# Patient Record
Sex: MALE | Race: WHITE | NOT HISPANIC OR LATINO | Employment: PART TIME | URBAN - METROPOLITAN AREA
[De-identification: names, ages, dates, MRNs, and addresses within clinical notes are randomized per-mention and may not be internally consistent; named-entity substitution may affect disease eponyms.]

---

## 2023-12-06 ENCOUNTER — TELEPHONE (OUTPATIENT)
Dept: CARDIOTHORACIC SURGERY | Facility: CLINIC | Age: 68
End: 2023-12-06
Payer: MEDICARE

## 2023-12-06 DIAGNOSIS — Z01.818 PRE-OP EVALUATION: Primary | ICD-10-CM

## 2023-12-06 NOTE — TELEPHONE ENCOUNTER
Called and left message for patient to call back to schedule appt and pft prior. Patient referred for mediastinal mass.

## 2023-12-06 NOTE — PROGRESS NOTES
History & Physical    SUBJECTIVE:     History of Present Illness:  Patient is a 68 y.o. male with HTN, HLD, and posterior mediastinal mass. Chronic cough with multiple report URIs. 3 courses of ABX in the last 6 months, most recently 1 week ago. Recent URI/LRI prompting chest CT in Nov. Chest CT 11/17/23 showed circumscribed smooth mass with 5.3 x 4.5 cm within posterior mediastinum without mass effect on esophagus. No dysphagia.He is very active. No issues with hiking [place]. Not on blood thinners. Does not remember previous imaging.     Nightly wine.     Chief Complaint   Patient presents with    Consult       Review of patient's allergies indicates:  No Known Allergies    Current Outpatient Medications   Medication Sig Dispense Refill    albuterol (PROVENTIL/VENTOLIN HFA) 90 mcg/actuation inhaler Inhale 2 puffs into the lungs every 4 (four) hours as needed.      atorvastatin (LIPITOR) 20 MG tablet = 1 tab, Oral, Daily, # 90 tab, 0 Refill(s), Maintenance, Pharmacy: [id number], 183, cm, 09/12/23 12:39:00 CDT, Height/Length Measured, 86.2, kg, 09/12/23 12:47:00 CDT, Weight Dosing      lisinopriL-hydrochlorothiazide (PRINZIDE,ZESTORETIC) 20-12.5 mg per tablet Take 1 tablet by mouth.      SYMBICORT 160-4.5 mcg/actuation HFAA Inhale 2 puffs into the lungs 2 (two) times daily.       No current facility-administered medications for this visit.       No past medical history on file.  No past surgical history on file.  No family history on file.        Review of Systems:  Review of Systems   Constitutional:  Negative for appetite change and fever.   Respiratory:  Positive for cough. Negative for chest tightness and shortness of breath.    Cardiovascular:  Negative for leg swelling.   Gastrointestinal:  Negative for abdominal pain.   Genitourinary:  Negative for difficulty urinating.   Musculoskeletal:  Negative for arthralgias.   Skin:  Negative for color change and rash.   Neurological:  Negative for  dizziness.   Psychiatric/Behavioral:  Negative for agitation. The patient is not nervous/anxious.        OBJECTIVE:     Vital Signs (Most Recent)    Vitals:    12/07/23 0905   BP: (!) 171/95   Pulse: 62   SpO2: 95%   Weight: 85.5 kg (188 lb 7.9 oz)   Height: 6' (1.829 m)   PainSc: 0-No pain       Physical Exam:  Physical Exam  Constitutional:       Appearance: Normal appearance.   HENT:      Head: Atraumatic.   Eyes:      Extraocular Movements: Extraocular movements intact.   Cardiovascular:      Rate and Rhythm: Normal rate and regular rhythm.      Pulses: Normal pulses.      Heart sounds: Normal heart sounds.   Pulmonary:      Effort: Pulmonary effort is normal.      Breath sounds: Normal breath sounds.   Abdominal:      Palpations: Abdomen is soft.   Musculoskeletal:      Cervical back: Normal range of motion.      Right lower leg: No edema.      Left lower leg: No edema.   Skin:     General: Skin is warm and dry.   Neurological:      General: No focal deficit present.      Mental Status: He is alert and oriented to person, place, and time.         Chest CT 11/17/23:   Outside images reviewed         PFTS:   FEV1: 107%  DLCO: 81%    ASSESSMENT/PLAN:     Patient is a 68 y.o. male with HTN, HLD, and posterior mediastinal mass.concerning for bronchogenic cyst vs esophageal duplication cyst. Favor bronchogenic cyst given history of URI. Discussed with Mr Loco that although small there is a small risk that this represents a malignant process but that biopsy is not advised given the more likelihood that this is a cyst. Also discussed with him that the main reason to operate is to avoid the cyst from becoming a complicated cyst. (Rupture, hemoptysis, etc)    PLAN:Plan     Needs 2D echo and pre op labs for clearance.   Chest CT with PO and IV contrast.   Plan for right robotic assisted posterior mediastinal mass resection, possible bronchoscopy possible EGD.   Will need consents morning of surgery.

## 2023-12-07 ENCOUNTER — OFFICE VISIT (OUTPATIENT)
Dept: CARDIOTHORACIC SURGERY | Facility: CLINIC | Age: 68
End: 2023-12-07
Payer: MEDICARE

## 2023-12-07 ENCOUNTER — HOSPITAL ENCOUNTER (OUTPATIENT)
Dept: PULMONOLOGY | Facility: CLINIC | Age: 68
Discharge: HOME | End: 2023-12-07
Payer: MEDICARE

## 2023-12-07 VITALS
DIASTOLIC BLOOD PRESSURE: 95 MMHG | BODY MASS INDEX: 25.53 KG/M2 | HEIGHT: 72 IN | WEIGHT: 188.5 LBS | SYSTOLIC BLOOD PRESSURE: 171 MMHG | HEART RATE: 62 BPM | OXYGEN SATURATION: 95 %

## 2023-12-07 DIAGNOSIS — Z01.810 PRE-OPERATIVE CARDIOVASCULAR EXAMINATION: ICD-10-CM

## 2023-12-07 DIAGNOSIS — J98.59 MEDIASTINAL MASS: ICD-10-CM

## 2023-12-07 DIAGNOSIS — D68.9 COAGULOPATHY: ICD-10-CM

## 2023-12-07 DIAGNOSIS — Z01.818 PRE-OP EVALUATION: Primary | ICD-10-CM

## 2023-12-07 DIAGNOSIS — Z01.818 PRE-OP EVALUATION: ICD-10-CM

## 2023-12-07 LAB
DLCO SINGLE BREATH LLN: 21.49
DLCO SINGLE BREATH PRE REF: 81.6 %
DLCO SINGLE BREATH REF: 28.42
DLCOC SBVA LLN: 2.75
DLCOC SBVA REF: 3.88
DLCOC SINGLE BREATH LLN: 21.49
DLCOC SINGLE BREATH REF: 28.42
DLCOCSBVAULN: 5
DLCOCSINGLEBREATHULN: 35.35
DLCOSINGLEBREATHULN: 35.35
DLCOVA LLN: 2.75
DLCOVA PRE REF: 97.4 %
DLCOVA PRE: 3.78 ML/(MIN*MMHG*L) (ref 2.75–5)
DLCOVA REF: 3.88
DLCOVAULN: 5
ERV LLN: -16448.89
ERV PRE REF: 113 %
ERV REF: 1.11
ERVULN: ABNORMAL
FEF 25 75 LLN: 1.15
FEF 25 75 PRE REF: 125.1 %
FEF 25 75 REF: 2.59
FET100 CHG: 13.9 %
FEV05 LLN: 1.64
FEV05 REF: 2.77
FEV1 CHG: -5.6 %
FEV1 FVC LLN: 63
FEV1 FVC PRE REF: 103.1 %
FEV1 FVC REF: 76
FEV1 LLN: 2.46
FEV1 PRE REF: 107.2 %
FEV1 REF: 3.38
FEV1 VOL CHG: -0.2
FRCPLETH LLN: 2.75
FRCPLETH PREREF: 96.5 %
FRCPLETH REF: 3.74
FRCPLETHULN: 4.73
FVC CHG: -3.6 %
FVC LLN: 3.33
FVC PRE REF: 103.6 %
FVC REF: 4.47
FVC VOL CHG: -0.17
IVC PRE: 4.27 L (ref 3.33–5.62)
IVC SINGLE BREATH LLN: 3.33
IVC SINGLE BREATH PRE REF: 95.6 %
IVC SINGLE BREATH REF: 4.47
IVCSINGLEBREATHULN: 5.62
LLN IC: -9999996.67
PEF LLN: 6.4
PEF PRE REF: 102.5 %
PEF REF: 8.78
PHYSICIAN COMMENT: ABNORMAL
POST FEF 25 75: 2.88 L/S (ref 1.15–4.61)
POST FET 100: 7.18 SEC
POST FEV1 FVC: 76.72 % (ref 62.94–87.63)
POST FEV1: 3.42 L (ref 2.46–4.24)
POST FEV5: 2.59 L (ref 1.64–3.91)
POST FVC: 4.46 L (ref 3.33–5.62)
POST PEF: 8.59 L/S (ref 6.4–11.16)
PRE DLCO: 23.19 ML/(MIN*MMHG) (ref 21.49–35.35)
PRE ERV: 1.26 L (ref -16448.89–16451.11)
PRE FEF 25 75: 3.24 L/S (ref 1.15–4.61)
PRE FET 100: 6.31 SEC
PRE FEV05 REF: 100.5 %
PRE FEV1 FVC: 78.38 % (ref 62.94–87.63)
PRE FEV1: 3.63 L (ref 2.46–4.24)
PRE FEV5: 2.79 L (ref 1.64–3.91)
PRE FRC PL: 3.61 L (ref 2.75–4.73)
PRE FVC: 4.63 L (ref 3.33–5.62)
PRE IC: 3.37 L (ref -9999996.67–#######.####)
PRE PEF: 9 L/S (ref 6.4–11.16)
PRE REF IC: 101.1 %
PRE RV: 2.35 L (ref 1.95–3.3)
PRE TLC: 6.98 L (ref 6.18–8.48)
RAW PRE REF: 47.7 %
RAW PRE: 1.46 CMH2O*S/L (ref 3.06–3.06)
RAW REF: 3.06
REF IC: 3.33
RV LLN: 1.95
RV PRE REF: 89.5 %
RV REF: 2.63
RVTLC LLN: 31
RVTLC PRE REF: 83.3 %
RVTLC PRE: 33.7 % (ref 31.5–49.46)
RVTLC REF: 40
RVTLCULN: 49
RVULN: 3.3
SGAW PRE REF: 188.9 %
SGAW PRE: 0.16 1/(CMH2O*S) (ref 0.08–0.08)
SGAW REF: 0.08
TLC LLN: 6.18
TLC PRE REF: 95.2 %
TLC REF: 7.33
TLC ULN: 8.48
ULN IC: ABNORMAL
VA PRE: 6.14 L (ref 7.18–7.18)
VA SINGLE BREATH LLN: 7.18
VA SINGLE BREATH PRE REF: 85.5 %
VA SINGLE BREATH REF: 7.18
VASINGLEBREATHULN: 7.18
VC LLN: 3.33
VC PRE REF: 103.6 %
VC PRE: 4.63 L (ref 3.33–5.62)
VC REF: 4.47
VC ULN: 5.62

## 2023-12-07 PROCEDURE — 94060 PR EVAL OF BRONCHOSPASM: ICD-10-PCS | Mod: 26,S$PBB,, | Performed by: INTERNAL MEDICINE

## 2023-12-07 PROCEDURE — 94729 DIFFUSING CAPACITY: CPT | Mod: 26,S$PBB,, | Performed by: INTERNAL MEDICINE

## 2023-12-07 PROCEDURE — 94729 DIFFUSING CAPACITY: CPT | Mod: PBBFAC | Performed by: INTERNAL MEDICINE

## 2023-12-07 PROCEDURE — 94060 EVALUATION OF WHEEZING: CPT | Mod: PBBFAC | Performed by: INTERNAL MEDICINE

## 2023-12-07 PROCEDURE — 99205 OFFICE O/P NEW HI 60 MIN: CPT | Mod: 57,S$PBB,, | Performed by: STUDENT IN AN ORGANIZED HEALTH CARE EDUCATION/TRAINING PROGRAM

## 2023-12-07 PROCEDURE — 99999 PR PBB SHADOW E&M-EST. PATIENT-LVL III: ICD-10-PCS | Mod: PBBFAC,,, | Performed by: STUDENT IN AN ORGANIZED HEALTH CARE EDUCATION/TRAINING PROGRAM

## 2023-12-07 PROCEDURE — 99213 OFFICE O/P EST LOW 20 MIN: CPT | Mod: PBBFAC,25 | Performed by: STUDENT IN AN ORGANIZED HEALTH CARE EDUCATION/TRAINING PROGRAM

## 2023-12-07 PROCEDURE — 94060 EVALUATION OF WHEEZING: CPT | Mod: 26,S$PBB,, | Performed by: INTERNAL MEDICINE

## 2023-12-07 PROCEDURE — 94729 PR C02/MEMBANE DIFFUSE CAPACITY: ICD-10-PCS | Mod: 26,S$PBB,, | Performed by: INTERNAL MEDICINE

## 2023-12-07 PROCEDURE — 94726 PLETHYSMOGRAPHY LUNG VOLUMES: CPT | Mod: 26,S$PBB,, | Performed by: INTERNAL MEDICINE

## 2023-12-07 PROCEDURE — 99999 PR PBB SHADOW E&M-EST. PATIENT-LVL III: CPT | Mod: PBBFAC,,, | Performed by: STUDENT IN AN ORGANIZED HEALTH CARE EDUCATION/TRAINING PROGRAM

## 2023-12-07 PROCEDURE — 94726 PULM FUNCT TST PLETHYSMOGRAP: ICD-10-PCS | Mod: 26,S$PBB,, | Performed by: INTERNAL MEDICINE

## 2023-12-07 PROCEDURE — 94726 PLETHYSMOGRAPHY LUNG VOLUMES: CPT | Mod: PBBFAC | Performed by: INTERNAL MEDICINE

## 2023-12-07 PROCEDURE — 99205 PR OFFICE/OUTPT VISIT, NEW, LEVL V, 60-74 MIN: ICD-10-PCS | Mod: 57,S$PBB,, | Performed by: STUDENT IN AN ORGANIZED HEALTH CARE EDUCATION/TRAINING PROGRAM

## 2023-12-07 RX ORDER — LISINOPRIL AND HYDROCHLOROTHIAZIDE 12.5; 2 MG/1; MG/1
1 TABLET ORAL
COMMUNITY
End: 2024-01-05

## 2023-12-07 RX ORDER — ATORVASTATIN CALCIUM 20 MG/1
TABLET, FILM COATED ORAL
COMMUNITY
Start: 2023-11-27

## 2023-12-07 RX ORDER — ALBUTEROL SULFATE 90 UG/1
2 AEROSOL, METERED RESPIRATORY (INHALATION) EVERY 4 HOURS PRN
Status: ON HOLD | COMMUNITY
End: 2024-01-09 | Stop reason: CLARIF

## 2023-12-07 RX ORDER — BUDESONIDE AND FORMOTEROL FUMARATE DIHYDRATE 160; 4.5 UG/1; UG/1
2 AEROSOL RESPIRATORY (INHALATION) 2 TIMES DAILY
COMMUNITY
Start: 2023-11-29

## 2023-12-08 ENCOUNTER — HOSPITAL ENCOUNTER (OUTPATIENT)
Dept: CARDIOLOGY | Facility: HOSPITAL | Age: 68
Discharge: HOME | End: 2023-12-08
Attending: PHYSICIAN ASSISTANT
Payer: MEDICARE

## 2023-12-08 VITALS — BODY MASS INDEX: 25.47 KG/M2 | WEIGHT: 188 LBS | HEIGHT: 72 IN

## 2023-12-08 DIAGNOSIS — Z01.810 PRE-OPERATIVE CARDIOVASCULAR EXAMINATION: ICD-10-CM

## 2023-12-08 DIAGNOSIS — J98.59 MEDIASTINAL MASS: Primary | ICD-10-CM

## 2023-12-08 PROCEDURE — 93306 ECHO (CUPID ONLY): ICD-10-PCS | Mod: 26,,, | Performed by: INTERNAL MEDICINE

## 2023-12-08 PROCEDURE — 93306 TTE W/DOPPLER COMPLETE: CPT | Mod: 26,,, | Performed by: INTERNAL MEDICINE

## 2023-12-08 PROCEDURE — 93306 TTE W/DOPPLER COMPLETE: CPT

## 2023-12-09 LAB
AORTIC ROOT ANNULUS: 3 CM
AORTIC VALVE CUSP SEPERATION: 2 CM
AV INDEX (PROSTH): 0.81
AV MEAN GRADIENT: 5 MMHG
AV PEAK GRADIENT: 10 MMHG
AV VALVE AREA BY VELOCITY RATIO: 2.6 CM²
AV VALVE AREA: 2.82 CM²
AV VELOCITY RATIO: 0.75
BSA FOR ECHO PROCEDURE: 2.08 M2
CV ECHO LV RWT: 0.49 CM
DOP CALC AO PEAK VEL: 1.56 M/S
DOP CALC AO VTI: 30 CM
DOP CALC LVOT AREA: 3.5 CM2
DOP CALC LVOT DIAMETER: 2.1 CM
DOP CALC LVOT PEAK VEL: 1.17 M/S
DOP CALC LVOT STROKE VOLUME: 84.47 CM3
DOP CALCLVOT PEAK VEL VTI: 24.4 CM
E WAVE DECELERATION TIME: 232 MSEC
E/A RATIO: 0.9
E/E' RATIO: 8.1 M/S
ECHO LV POSTERIOR WALL: 1.22 CM (ref 0.6–1.1)
FRACTIONAL SHORTENING: 37 % (ref 28–44)
INTERVENTRICULAR SEPTUM: 1.04 CM (ref 0.6–1.1)
IVRT: 106 MSEC
LEFT ATRIUM SIZE: 3.7 CM
LEFT INTERNAL DIMENSION IN SYSTOLE: 3.11 CM (ref 2.1–4)
LEFT VENTRICLE DIASTOLIC VOLUME INDEX: 55.77 ML/M2
LEFT VENTRICLE DIASTOLIC VOLUME: 116 ML
LEFT VENTRICLE MASS INDEX: 102 G/M2
LEFT VENTRICLE SYSTOLIC VOLUME INDEX: 18.4 ML/M2
LEFT VENTRICLE SYSTOLIC VOLUME: 38.2 ML
LEFT VENTRICULAR INTERNAL DIMENSION IN DIASTOLE: 4.96 CM (ref 3.5–6)
LEFT VENTRICULAR MASS: 212.21 G
LV LATERAL E/E' RATIO: 6.75 M/S
LV SEPTAL E/E' RATIO: 10.13 M/S
LVOT MG: 3 MMHG
LVOT MV: 0.76 CM/S
MV PEAK A VEL: 0.9 M/S
MV PEAK E VEL: 0.81 M/S
MV STENOSIS PRESSURE HALF TIME: 65 MS
MV VALVE AREA P 1/2 METHOD: 3.38 CM2
PISA TR MAX VEL: 2.51 M/S
RA PRESSURE ESTIMATED: 3 MMHG
RIGHT VENTRICULAR END-DIASTOLIC DIMENSION: 2.22 CM
RV TB RVSP: 6 MMHG
TDI LATERAL: 0.12 M/S
TDI SEPTAL: 0.08 M/S
TDI: 0.1 M/S
TR MAX PG: 25 MMHG
TV REST PULMONARY ARTERY PRESSURE: 28 MMHG
Z-SCORE OF LEFT VENTRICULAR DIMENSION IN END DIASTOLE: -2.5
Z-SCORE OF LEFT VENTRICULAR DIMENSION IN END SYSTOLE: -1.78

## 2023-12-12 ENCOUNTER — PATIENT MESSAGE (OUTPATIENT)
Dept: CARDIOTHORACIC SURGERY | Facility: CLINIC | Age: 68
End: 2023-12-12
Payer: MEDICARE

## 2023-12-18 ENCOUNTER — HOSPITAL ENCOUNTER (OUTPATIENT)
Dept: RADIOLOGY | Facility: HOSPITAL | Age: 68
Discharge: HOME | End: 2023-12-18
Attending: PHYSICIAN ASSISTANT
Payer: MEDICARE

## 2023-12-18 DIAGNOSIS — Z01.810 PRE-OPERATIVE CARDIOVASCULAR EXAMINATION: Primary | ICD-10-CM

## 2023-12-18 DIAGNOSIS — J98.59 MEDIASTINAL MASS: Primary | ICD-10-CM

## 2023-12-18 DIAGNOSIS — J98.59 MEDIASTINAL MASS: ICD-10-CM

## 2023-12-18 PROCEDURE — 71260 CT THORAX DX C+: CPT | Mod: TC,PO

## 2023-12-18 PROCEDURE — 74160 CT ABDOMEN W/CONTRAST: CPT | Mod: TC,PO

## 2023-12-18 PROCEDURE — 25500020 PHARM REV CODE 255: Mod: PO | Performed by: PHYSICIAN ASSISTANT

## 2023-12-18 RX ADMIN — IOHEXOL 100 ML: 350 INJECTION, SOLUTION INTRAVENOUS at 01:12

## 2023-12-20 DIAGNOSIS — J98.59 MEDIASTINAL MASS: Primary | ICD-10-CM

## 2023-12-22 ENCOUNTER — TELEPHONE (OUTPATIENT)
Dept: ENDOSCOPY | Facility: HOSPITAL | Age: 68
End: 2023-12-22
Payer: MEDICARE

## 2023-12-22 ENCOUNTER — PATIENT MESSAGE (OUTPATIENT)
Dept: CARDIOTHORACIC SURGERY | Facility: CLINIC | Age: 68
End: 2023-12-22
Payer: MEDICARE

## 2023-12-22 NOTE — TELEPHONE ENCOUNTER
Received phone call from Kimberlee from Dr. Orozco's office to schedule patient for an EUS. Informed Kimberlee that message will have to be sent to the advanced endoscopy scheduling team. Verbalized understanding.

## 2023-12-22 NOTE — TELEPHONE ENCOUNTER
Please reach out to patient to schedule. Patient has referral to endo procedure  placed on 12/20/23.    Thank You,    Stefanie

## 2023-12-26 ENCOUNTER — TELEPHONE (OUTPATIENT)
Dept: ENDOSCOPY | Facility: HOSPITAL | Age: 68
End: 2023-12-26
Payer: MEDICARE

## 2023-12-26 NOTE — TELEPHONE ENCOUNTER
Hello,     Patient called and left a voicemail to schedule for an endoscopy procedure. Refendo is from Dr. Orozco for an EUS. Please reach out to patient to schedule.     Thank you,   Jodie

## 2023-12-26 NOTE — TELEPHONE ENCOUNTER
Called patient. Patient did not answer. Left message for patient that his procedure to be scheduled is for an EUS. Will send message to advanced schedulers to reach out to him.

## 2023-12-26 NOTE — TELEPHONE ENCOUNTER
Received patient's voicemail    1:15 PM MRN: [id number] This is Clarence Alberto [birth year]. Phone number is [phone]. I was expecting a call today to schedule an endoscopy. I have a surgery with Dr. Orozco thoracic surgery. He put in the order for the Endoscopy.

## 2023-12-27 ENCOUNTER — TELEPHONE (OUTPATIENT)
Dept: ENDOSCOPY | Facility: HOSPITAL | Age: 68
End: 2023-12-27
Payer: MEDICARE

## 2023-12-27 ENCOUNTER — PATIENT MESSAGE (OUTPATIENT)
Dept: ENDOSCOPY | Facility: HOSPITAL | Age: 68
End: 2023-12-27
Payer: MEDICARE

## 2023-12-27 DIAGNOSIS — J98.59 MEDIASTINAL MASS: Primary | ICD-10-CM

## 2023-12-27 NOTE — TELEPHONE ENCOUNTER
Spoke to Pt to schedule procedure(s) Upper Endoscopy Ultrasound (EUS)       Physician to perform procedure(s) Dr. VEE Turpin  Date of Procedure (s) 12/29/23  Arrival Time 8:00 AM  Time of Procedure(s) 9:00 AM   Location of Procedure(s) [address]  Type of Rx Prep sent to patient: N/A  Instructions provided to patient via [facility]    Patient was informed on the following information and verbalized understanding. Screening questionnaire reviewed with patient and complete. If procedure requires anesthesia, a responsible adult needs to be present to accompany the patient home, patient cannot drive after receiving anesthesia. Appointment details are tentative, especially check-in time. Patient will receive a prep-op call 7 days prior to confirm check-in time for procedure. If applicable the patient should contact their pharmacy to verify Rx for procedure prep is ready for pick-up. Patient was advised to call the scheduling department at [phone] if pharmacy states no Rx is available. Patient was advised to call the endoscopy scheduling department if any questions or concerns arise.      [facility] Scheduling Department

## 2023-12-27 NOTE — TELEPHONE ENCOUNTER
Ciera estrella MD Thomas, [place], MA; [facility] Schedulers; Forest Orozco MD  EGD and EUS with any provider, preferably main campus within next 1-2 weeks to eval posterior mediastinal mass/lesion thought to be cystic on imaging; needle sampling only requested if it appears to be a mass arising from the wall of the esophagus; no needle sampling if cystic.

## 2023-12-29 ENCOUNTER — HOSPITAL ENCOUNTER (OUTPATIENT)
Facility: HOSPITAL | Age: 68
Discharge: HOME | End: 2023-12-29
Attending: INTERNAL MEDICINE | Admitting: INTERNAL MEDICINE
Payer: MEDICARE

## 2023-12-29 ENCOUNTER — ANESTHESIA (OUTPATIENT)
Dept: ENDOSCOPY | Facility: HOSPITAL | Age: 68
End: 2023-12-29
Payer: MEDICARE

## 2023-12-29 ENCOUNTER — ANESTHESIA EVENT (OUTPATIENT)
Dept: ENDOSCOPY | Facility: HOSPITAL | Age: 68
End: 2023-12-29
Payer: MEDICARE

## 2023-12-29 VITALS
TEMPERATURE: 98 F | HEIGHT: 72 IN | WEIGHT: 188 LBS | RESPIRATION RATE: 17 BRPM | DIASTOLIC BLOOD PRESSURE: 99 MMHG | BODY MASS INDEX: 25.47 KG/M2 | HEART RATE: 52 BPM | OXYGEN SATURATION: 100 % | SYSTOLIC BLOOD PRESSURE: 169 MMHG

## 2023-12-29 DIAGNOSIS — J98.59 MEDIASTINAL MASS: ICD-10-CM

## 2023-12-29 PROCEDURE — 37000008 HC ANESTHESIA 1ST 15 MINUTES: Performed by: INTERNAL MEDICINE

## 2023-12-29 PROCEDURE — 43237 ENDOSCOPIC US EXAM ESOPH: CPT | Performed by: INTERNAL MEDICINE

## 2023-12-29 PROCEDURE — D9220A PRA ANESTHESIA: Mod: ANES,,, | Performed by: ANESTHESIOLOGY

## 2023-12-29 PROCEDURE — D9220A PRA ANESTHESIA: ICD-10-PCS | Mod: ANES,,, | Performed by: ANESTHESIOLOGY

## 2023-12-29 PROCEDURE — 37000009 HC ANESTHESIA EA ADD 15 MINS: Performed by: INTERNAL MEDICINE

## 2023-12-29 PROCEDURE — D9220A PRA ANESTHESIA: Mod: CRNA,,, | Performed by: NURSE ANESTHETIST, CERTIFIED REGISTERED

## 2023-12-29 PROCEDURE — 25000003 PHARM REV CODE 250: Performed by: NURSE ANESTHETIST, CERTIFIED REGISTERED

## 2023-12-29 PROCEDURE — 43237 ENDOSCOPIC US EXAM ESOPH: CPT | Mod: ,,, | Performed by: INTERNAL MEDICINE

## 2023-12-29 PROCEDURE — 63600175 PHARM REV CODE 636 W HCPCS: Performed by: NURSE ANESTHETIST, CERTIFIED REGISTERED

## 2023-12-29 PROCEDURE — 25000003 PHARM REV CODE 250: Performed by: INTERNAL MEDICINE

## 2023-12-29 PROCEDURE — D9220A PRA ANESTHESIA: ICD-10-PCS | Mod: CRNA,,, | Performed by: NURSE ANESTHETIST, CERTIFIED REGISTERED

## 2023-12-29 RX ORDER — SODIUM CHLORIDE 9 MG/ML
INJECTION, SOLUTION INTRAVENOUS CONTINUOUS
Status: DISCONTINUED | OUTPATIENT
Start: 2023-12-29 | End: 2023-12-29 | Stop reason: HOSPADM

## 2023-12-29 RX ORDER — LIDOCAINE HYDROCHLORIDE 10 MG/ML
INJECTION, SOLUTION INTRAVENOUS
Status: DISCONTINUED | OUTPATIENT
Start: 2023-12-29 | End: 2023-12-29

## 2023-12-29 RX ORDER — PROPOFOL 10 MG/ML
VIAL (ML) INTRAVENOUS CONTINUOUS PRN
Status: DISCONTINUED | OUTPATIENT
Start: 2023-12-29 | End: 2023-12-29

## 2023-12-29 RX ADMIN — SODIUM CHLORIDE: 0.9 INJECTION, SOLUTION INTRAVENOUS at 09:12

## 2023-12-29 RX ADMIN — PROPOFOL 200 MCG/KG/MIN: 10 INJECTION, EMULSION INTRAVENOUS at 09:12

## 2023-12-29 RX ADMIN — SODIUM CHLORIDE: 9 INJECTION, SOLUTION INTRAVENOUS at 07:12

## 2023-12-29 RX ADMIN — LIDOCAINE HYDROCHLORIDE 50 MG: 10 INJECTION, SOLUTION INTRAVENOUS at 09:12

## 2023-12-29 NOTE — H&P
Short Stay Endoscopy History and Physical    PCP - No, Primary Doctor  Referring Physician - Forest Orozco MD  [id number] [place] [address]    Procedure - eus  ASA - per anesthesia  Mallampati - per anesthesia  History of Anesthesia problems - no  Family history Anesthesia problems -  no   Plan of anesthesia - General    HPI:  This is a 68 y.o. male here for evaluation of: mediastinal lesion    Reflux - no  Dysphagia - no  Abdominal pain - no  Diarrhea - no    ROS:  Constitutional: No fevers, chills, No weight loss  CV: No chest pain  Pulm: No cough, No shortness of breath  Ophtho: No vision changes  GI: see HPI  Derm: No rash    Medical History:  has no past medical history on file.    Surgical History:  has no past surgical history on file.    Family History: family history is not on file..    Social History:  reports that he has never smoked. He does not have any smokeless tobacco history on file. He reports current alcohol use.    Review of patient's allergies indicates:  No Known Allergies    Medications:   Medications Prior to Admission   Medication Sig Dispense Refill Last Dose    atorvastatin (LIPITOR) 20 MG tablet = 1 tab, Oral, Daily, # 90 tab, 0 Refill(s), Maintenance, Pharmacy: [id number], 183, cm, 09/12/23 12:39:00 CDT, Height/Length Measured, 86.2, kg, 09/12/23 12:47:00 CDT, Weight Dosing   12/29/2023    lisinopriL-hydrochlorothiazide (PRINZIDE,ZESTORETIC) 20-12.5 mg per tablet Take 1 tablet by mouth.   12/29/2023    SYMBICORT 160-4.5 mcg/actuation HFAA Inhale 2 puffs into the lungs 2 (two) times daily.   12/29/2023    albuterol (PROVENTIL/VENTOLIN HFA) 90 mcg/actuation inhaler Inhale 2 puffs into the lungs every 4 (four) hours as needed.          Physical Exam:    Vital Signs:   Vitals:    12/29/23 0740   BP: (!) 197/99   Pulse: (!) 59   Resp: 16   Temp: 97.7 °F (36.5 °C)       General Appearance: Well appearing in no acute distress    Labs:  Lab Results   Component Value  Date    WBC 8.98 12/07/2023    HGB 15.3 12/07/2023    HCT 44.0 12/07/2023     12/07/2023    CHOL 233 (H) 08/12/2020    TRIG 94 08/12/2020    HDL 64 (H) 08/12/2020    ALT 31 12/07/2023    AST 29 12/07/2023     12/07/2023    K 3.7 12/07/2023     12/07/2023    CREATININE 1.2 12/07/2023    BUN 21 12/07/2023    CO2 27 12/07/2023    TSH 1.88 08/12/2020    INR 0.9 12/07/2023       I have explained the risks and benefits of this endoscopic procedure to the patient including but not limited to bleeding, inflammation, infection, perforation, and death.      Pierre Turpin MD

## 2023-12-29 NOTE — [employer] PATIENT INSTRUCTIONS
Discharge Summary/Instructions after an Endoscopic Procedure  Patient Name: Clarence Alberto  Patient MRN: [id number]  Patient YOB: 1955 Friday, December 29, 2023  Pierre Turpin MD  Dear patient,  As a result of recent federal legislation (The Federal Cures Act), you may   receive lab or pathology results from your procedure in your [facility]   account before your physician is able to contact you. Your physician or   their representative will relay the results to you with their   recommendations at their soonest availability.  Thank you,  RESTRICTIONS:  During your procedure today, you received medications for sedation.  These   medications may affect your judgment, balance and coordination.  Therefore,   for 24 hours, you have the following restrictions:   - DO NOT drive a car, operate machinery, make legal/financial decisions,   sign important papers or drink alcohol.    ACTIVITY:  Today: no heavy lifting, straining or running due to procedural   sedation/anesthesia.  The following day: return to full activity including work.  DIET:  Eat and drink normally unless instructed otherwise.     TREATMENT FOR COMMON SIDE EFFECTS:  - Mild abdominal pain, nausea, belching, bloating or excessive gas:  rest,   eat lightly and use a heating pad.  - Sore Throat: treat with throat lozenges and/or gargle with warm salt   water.  - Because air was used during the procedure, expelling large amounts of air   from your rectum or belching is normal.  - If a bowel prep was taken, you may not have a bowel movement for 1-3 days.    This is normal.  SYMPTOMS TO WATCH FOR AND REPORT TO YOUR PHYSICIAN:  1. Abdominal pain or bloating, other than gas cramps.  2. Chest pain.  3. Back pain.  4. Signs of infection such as: chills or fever occurring within 24 hours   after the procedure.  5. Rectal bleeding, which would show as bright red, maroon, or black stools.   (A tablespoon of blood from the rectum is not serious, especially  if   hemorrhoids are present.)  6. Vomiting.  7. Weakness or dizziness.  GO DIRECTLY TO THE NEAREST EMERGENCY ROOM IF YOU HAVE ANY OF THE FOLLOWING:      Difficulty breathing              Chills and/or fever over 101 F   Persistent vomiting and/or vomiting blood   Severe abdominal pain   Severe chest pain   Black, tarry stools   Bleeding- more than one tablespoon   Any other symptom or condition that you feel may need urgent attention  Your doctor recommends these additional instructions:  If any biopsies were taken, your doctors clinic will contact you in 1 to 2   weeks with any results.  - Discharge patient to home.   - Resume previous diet.   - Continue present medications.   - Return to referring physician as previously scheduled.  For questions, problems or results please call your physician - Pierre Turpin MD at Work:  [phone].  [facility], EMERGENCY ROOM PHONE NUMBER: [phone]  IF A COMPLICATION OR EMERGENCY SITUATION ARISES AND YOU ARE UNABLE TO REACH   YOUR PHYSICIAN - GO DIRECTLY TO THE EMERGENCY ROOM.  Pierre Turpin MD  12/29/2023 9:26:19 AM  This report has been verified and signed electronically.  Dear patient,  As a result of recent federal legislation (The Federal Cures Act), you may   receive lab or pathology results from your procedure in your [facility]   account before your physician is able to contact you. Your physician or   their representative will relay the results to you with their   recommendations at their soonest availability.  Thank you,  [employer]ATION

## 2023-12-29 NOTE — TRANSFER OF CARE
Anesthesia Transfer of Care Note    Patient: Clarence Alberto    Procedure(s) Performed: Procedure(s) (LRB):  EGD (ESOPHAGOGASTRODUODENOSCOPY) (N/A)  ULTRASOUND, UPPER GI TRACT, ENDOSCOPIC (N/A)    Patient location: [facility]    Anesthesia Type: general    Transport from OR: Transported from OR on room air with adequate spontaneous ventilation    Post pain: adequate analgesia    Post assessment: no apparent anesthetic complications and tolerated procedure well    Post vital signs: stable    Level of consciousness: lethargic    Nausea/Vomiting: no nausea/vomiting    Complications: none    Transfer of care protocol was followed      Last vitals: Visit Vitals  BP (!) 197/99 (Patient Position: Lying)   Pulse (!) 59   Temp 36.5 °C (97.7 °F) (Temporal)   Resp 16   Ht 6' (1.829 m)   Wt 85.3 kg (188 lb)   SpO2 99%   BMI 25.50 kg/m²

## 2023-12-29 NOTE — ANESTHESIA PREPROCEDURE EVALUATION
2023  Clarence Alberto is a 68 y.o., male.  Pre-operative evaluation for EGD (ESOPHAGOGASTRODUODENOSCOPY) (N/A), ULTRASOUND, UPPER GI TRACT, ENDOSCOPIC (N/A)    Chief Complaint: psoterior mediastinal mass    PMH:  HTN, HLP.   Chronic cough with multiple report URIs. 3 courses of ABX in the last 6 months, most recently 1 week ago. Recent URI/LRI prompting chest CT in Nov. Chest CT 23 showed circumscribed smooth mass with 5.3 x 4.5 cm within posterior mediastinum without mass effect on esophagus. Follow up CT Chest/Abdomen with contrast 23 redemonstrated known heterogeneous oval soft tissue mass in the posterior mediastinum appearing to arise from the right lateral wall of the esophagus.      History reviewed. No pertinent surgical history.      Vital Signs Range (Last 24H):  Temp:  [36.5 °C (97.7 °F)]   Pulse:  [59]   Resp:  [16]   BP: (197)/(99)   SpO2:  [99 %]       CBC:     Recent Labs   Lab 23  1127   WBC 8.98   RBC 4.77   HGB 15.3   HCT 44.0      MCV 92   MCH 32.1*   MCHC 34.8       CMP:   Recent Labs   Lab 23  1127      K 3.7      CO2 27   BUN 21   CREATININE 1.2      CALCIUM 10.2   ALBUMIN 4.2   PROT 8.4   ALKPHOS 67   ALT 31   AST 29   BILITOT 0.5       INR:  Recent Labs   Lab 23  1127   INR 0.9   APTT 27.6         Diagnostic Studies:      EKD Echo: 23       Left Ventricle: The left ventricle is normal in size. Mildly increased wall thickness. There is mild concentric hypertrophy. Normal wall motion. There is normal systolic function with a visually estimated ejection fraction of 65 - 70%. There is normal diastolic function.    Right Ventricle: Normal right ventricular cavity size. Wall thickness is normal. Right ventricle wall motion  is normal. Systolic function is normal.    Left Atrium: Left atrium is mildly dilated.     Mitral Valve: There is mild regurgitation with a centrally directed jet.    Tricuspid Valve: There is mild regurgitation with a centrally directed jet.    IVC/SVC: Normal venous pressure at 3 mmHg.    Pre-op Assessment    I have reviewed the Patient Summary Reports.     I have reviewed the Nursing Notes. I have reviewed the NPO Status.   I have reviewed the Medications.     Review of Systems  Anesthesia Hx:  No problems with previous Anesthesia                Social:  Non-Smoker, No Alcohol Use       Cardiovascular:  Cardiovascular Normal                                            Pulmonary:  Pulmonary Normal                       Neurological:  Neurology Normal                                          Physical Exam  General: Well nourished, Cooperative, Alert and Oriented    Airway:  Mallampati: I   Mouth Opening: Normal  TM Distance: Normal  Tongue: Normal  Neck ROM: Normal ROM    Dental:  Intact    Chest/Lungs:  Normal Respiratory Rate        Anesthesia Plan  Type of Anesthesia, risks & benefits discussed:    Anesthesia Type: Gen Natural Airway, Gen ETT  Intra-op Monitoring Plan: Standard ASA Monitors  Post Op Pain Control Plan: multimodal analgesia  Induction:  IV  Airway Plan: Direct  Informed Consent: Informed consent signed with the Patient and all parties understand the risks and agree with anesthesia plan.  All questions answered.   ASA Score: 3  Day of Surgery Review of History & Physical: H&P Update referred to the surgeon/provider.    Ready For Surgery From Anesthesia Perspective.     .

## 2023-12-29 NOTE — ANESTHESIA POSTPROCEDURE EVALUATION
Anesthesia Post Evaluation    Patient: Claernce Alberto    Procedure(s) Performed: Procedure(s) (LRB):  EGD (ESOPHAGOGASTRODUODENOSCOPY) (N/A)  ULTRASOUND, UPPER GI TRACT, ENDOSCOPIC (N/A)    Final Anesthesia Type: general      Patient location during evaluation: PACU  Patient participation: Yes- Able to Participate  Level of consciousness: awake and alert and oriented  Post-procedure vital signs: reviewed and stable  Pain management: adequate  Airway patency: patent    PONV status at discharge: No PONV  Anesthetic complications: no      Cardiovascular status: hemodynamically stable  Respiratory status: unassisted  Hydration status: euvolemic  Follow-up not needed.              Vitals Value Taken Time   /99 12/29/23 1000   Temp 36.6 °C (97.9 °F) 12/29/23 0930   Pulse 52 12/29/23 1000   Resp 17 12/29/23 1000   SpO2 100 % 12/29/23 1000         No case tracking events are documented in the log.      Pain/Joey Score: Joey Score: 10 (12/29/2023  9:45 AM)

## 2024-01-03 ENCOUNTER — TELEPHONE (OUTPATIENT)
Dept: ENDOSCOPY | Facility: HOSPITAL | Age: 69
End: 2024-01-03

## 2024-01-03 ENCOUNTER — CLINICAL SUPPORT (OUTPATIENT)
Dept: ENDOSCOPY | Facility: HOSPITAL | Age: 69
End: 2024-01-03
Payer: MEDICARE

## 2024-01-03 DIAGNOSIS — J98.59 MEDIASTINAL MASS: ICD-10-CM

## 2024-01-05 RX ORDER — MULTIVIT WITH MIN/ALA/HERBS 50 MG
1 TABLET ORAL DAILY
COMMUNITY

## 2024-01-05 RX ORDER — LISINOPRIL 40 MG/1
40 TABLET ORAL DAILY
COMMUNITY

## 2024-01-08 ENCOUNTER — TELEPHONE (OUTPATIENT)
Dept: CARDIOTHORACIC SURGERY | Facility: CLINIC | Age: 69
End: 2024-01-08
Payer: MEDICARE

## 2024-01-08 ENCOUNTER — ANESTHESIA EVENT (OUTPATIENT)
Dept: SURGERY | Facility: HOSPITAL | Age: 69
DRG: 376 | End: 2024-01-08
Payer: MEDICARE

## 2024-01-08 NOTE — ANESTHESIA PREPROCEDURE EVALUATION
[facility]  Anesthesia Pre-Operative Evaluation         Patient Name: Clarence Alberto  YOB: 1955  MRN: [id number]    SUBJECTIVE:     Pre-operative evaluation for Procedure(s) (LRB):  XI ROBOTIC MEDIASTINAL MASS RESECTION POSSIBLE BRONCH POSS EGD (Right)     01/08/2024    Clarence Alberto is a 68 y.o. male w/ a significant PMHx of HTN, HLD, and Chronic cough with multiple report URIs. Recent URI/LRI prompting chest CT in Nov. Chest CT 11/17/23 showed circumscribed smooth mass with 5.3 x 4.5 cm within posterior mediastinum without mass effect on esophagus. Follow up CT Chest/Abdomen with contrast 12/18/23 redemonstrated known heterogeneous oval soft tissue mass in the posterior mediastinum appearing to arise from the right lateral wall of the esophagus.        Pt now presents for the above procedure.      LDA: None documented.    Prev airway: None documented.     Drips: None documented.    Patient Active Problem List   Diagnosis    Pre-op evaluation       Review of patient's allergies indicates:  No Known Allergies    Current Inpatient Medications:      No current facility-administered medications on file prior to encounter.     Current Outpatient Medications on File Prior to Encounter   Medication Sig Dispense Refill    atorvastatin (LIPITOR) 20 MG tablet = 1 tab, Oral, Daily, # 90 tab, 0 Refill(s), Maintenance, Pharmacy: [id number], 183, cm, 09/12/23 12:39:00 CDT, Height/Length Measured, 86.2, kg, 09/12/23 12:47:00 CDT, Weight Dosing      lisinopriL (PRINIVIL,ZESTRIL) 40 MG tablet Take 40 mg by mouth once daily.      om-3-dha-epa-fish-D3-saw-herbs (PROSTATE OPTIMIZER) 6.6 mg-100 mg- 200 mg-5 mcg Cap Take 1 capsule by mouth Daily.      albuterol (PROVENTIL/VENTOLIN HFA) 90 mcg/actuation inhaler Inhale 2 puffs into the lungs every 4 (four) hours as needed.      SYMBICORT 160-4.5 mcg/actuation HFAA Inhale 2 puffs into the lungs 2 (two) times daily.         Past Surgical  "History:   Procedure Laterality Date    ENDOSCOPIC ULTRASOUND OF UPPER GASTROINTESTINAL TRACT N/A 12/29/2023    Procedure: ULTRASOUND, UPPER GI TRACT, ENDOSCOPIC;  Surgeon: Pierre Turpin MD;  Location: [facility] ([address] [place] [address]);  Service: Endoscopy;  Laterality: N/A;  12/27/23: instructions sent via portal- GD    ESOPHAGOGASTRODUODENOSCOPY N/A 12/29/2023    Procedure: EGD (ESOPHAGOGASTRODUODENOSCOPY);  Surgeon: Pierre Turpin MD;  Location: [facility] ([address] [place] [address]);  Service: Endoscopy;  Laterality: N/A;       Social History     Socioeconomic History    Marital status:    Tobacco Use    Smoking status: Never   Substance and Sexual Activity    Alcohol use: Yes       OBJECTIVE:     Vital Signs Range (Last 24H):         CBC:   No results for input(s): "WBC", "RBC", "HGB", "HCT", "PLT", "MCV", "MCH", "MCHC" in the last 72 hours.    CMP: No results for input(s): "NA", "K", "CL", "CO2", "BUN", "CREATININE", "GLU", "MG", "PHOS", "CALCIUM", "ALBUMIN", "PROT", "ALKPHOS", "ALT", "AST", "BILITOT" in the last 72 hours.    INR:  No results for input(s): "PT", "INR", "PROTIME", "APTT" in the last 72 hours.    Diagnostic Studies: No relevant studies.    EKG:   No results found for this or any previous visit.     2D ECHO:   Results for orders placed during the hospital encounter of 12/08/23    Echo    Interpretation Summary    Left Ventricle: The left ventricle is normal in size. Mildly increased wall thickness. There is mild concentric hypertrophy. Normal wall motion. There is normal systolic function with a visually estimated ejection fraction of 65 - 70%. There is normal diastolic function.    Right Ventricle: Normal right ventricular cavity size. Wall thickness is normal. Right ventricle wall motion  is normal. Systolic function is normal.    Left Atrium: Left atrium is mildly dilated.    Mitral Valve: There is mild regurgitation with a centrally directed jet.    Tricuspid Valve: There is mild regurgitation with a centrally " directed jet.    IVC/SVC: Normal venous pressure at 3 mmHg.         ASSESSMENT/PLAN:           Pre-op Assessment    I have reviewed the Patient Summary Reports.     I have reviewed the Nursing Notes. I have reviewed the NPO Status.   I have reviewed the Medications.     Review of Systems  Anesthesia Hx:  No problems with previous Anesthesia   History of prior surgery of interest to airway management or planning:          Denies Family Hx of Anesthesia complications.    Denies Personal Hx of Anesthesia complications.                    Hematology/Oncology:                        --  Cancer in past history:                     Cardiovascular:     Hypertension       Denies Angina.     hyperlipidemia   ECG has been reviewed.                          Pulmonary:       Denies Shortness of breath.  Denies Recent URI.                 Renal/:  Renal/ Normal                 Hepatic/GI:      Denies GERD. Denies Liver Disease.            Neurological:    Denies CVA.    Denies Seizures.                                Endocrine:  Endocrine Normal Denies Diabetes.               Physical Exam  General: Well nourished, Cooperative and Alert    Airway:  Mallampati: III / II  Mouth Opening: Normal  TM Distance: 4 - 6 cm  Tongue: Normal  Neck ROM: Normal ROM    Dental:  Intact        Anesthesia Plan  Type of Anesthesia, risks & benefits discussed:    Anesthesia Type: Gen ETT  Intra-op Monitoring Plan: Standard ASA Monitors and Art Line  Post Op Pain Control Plan: multimodal analgesia and IV/PO Opioids PRN  Induction:  IV  Airway Plan: Direct and Video, Post-Induction  Informed Consent: Informed consent signed with the Patient and all parties understand the risks and agree with anesthesia plan.  All questions answered. Patient consented to blood products? Yes  ASA Score: 3  Day of Surgery Review of History & Physical: H&P Update referred to the surgeon/provider.    Ready For Surgery From Anesthesia Perspective.     .

## 2024-01-09 ENCOUNTER — ANESTHESIA (OUTPATIENT)
Dept: SURGERY | Facility: HOSPITAL | Age: 69
DRG: 376 | End: 2024-01-09
Payer: MEDICARE

## 2024-01-09 ENCOUNTER — HOSPITAL ENCOUNTER (INPATIENT)
Facility: HOSPITAL | Age: 69
LOS: 2 days | Discharge: HOME | DRG: 376 | End: 2024-01-11
Attending: STUDENT IN AN ORGANIZED HEALTH CARE EDUCATION/TRAINING PROGRAM | Admitting: STUDENT IN AN ORGANIZED HEALTH CARE EDUCATION/TRAINING PROGRAM
Payer: MEDICARE

## 2024-01-09 DIAGNOSIS — Z01.818 PRE-OP EVALUATION: Primary | ICD-10-CM

## 2024-01-09 DIAGNOSIS — K22.89 ESOPHAGEAL MASS: ICD-10-CM

## 2024-01-09 DIAGNOSIS — K22.89 SUBEPITHELIAL ESOPHAGEAL MASS: ICD-10-CM

## 2024-01-09 LAB
ABO + RH BLD: NORMAL
BLD GP AB SCN CELLS X3 SERPL QL: NORMAL
SPECIMEN OUTDATE: NORMAL

## 2024-01-09 PROCEDURE — 88381 MICRODISSECTION MANUAL: CPT | Performed by: PATHOLOGY

## 2024-01-09 PROCEDURE — 25000003 PHARM REV CODE 250: Performed by: NURSE ANESTHETIST, CERTIFIED REGISTERED

## 2024-01-09 PROCEDURE — 36620 INSERTION CATHETER ARTERY: CPT | Mod: 59,,, | Performed by: ANESTHESIOLOGY

## 2024-01-09 PROCEDURE — 71000016 HC POSTOP RECOV ADDL HR: Performed by: STUDENT IN AN ORGANIZED HEALTH CARE EDUCATION/TRAINING PROGRAM

## 2024-01-09 PROCEDURE — 86920 COMPATIBILITY TEST SPIN: CPT | Performed by: ANESTHESIOLOGY

## 2024-01-09 PROCEDURE — 25000242 PHARM REV CODE 250 ALT 637 W/ HCPCS

## 2024-01-09 PROCEDURE — 25000003 PHARM REV CODE 250: Performed by: PHYSICIAN ASSISTANT

## 2024-01-09 PROCEDURE — 37000008 HC ANESTHESIA 1ST 15 MINUTES: Performed by: STUDENT IN AN ORGANIZED HEALTH CARE EDUCATION/TRAINING PROGRAM

## 2024-01-09 PROCEDURE — 88342 IMHCHEM/IMCYTCHM 1ST ANTB: CPT | Performed by: PATHOLOGY

## 2024-01-09 PROCEDURE — 20600001 HC STEP DOWN PRIVATE ROOM

## 2024-01-09 PROCEDURE — 94640 AIRWAY INHALATION TREATMENT: CPT

## 2024-01-09 PROCEDURE — 32999 UNLISTED PX LUNGS & PLEURA: CPT | Mod: ,,, | Performed by: STUDENT IN AN ORGANIZED HEALTH CARE EDUCATION/TRAINING PROGRAM

## 2024-01-09 PROCEDURE — 88341 IMHCHEM/IMCYTCHM EA ADD ANTB: CPT | Mod: 26,,, | Performed by: PATHOLOGY

## 2024-01-09 PROCEDURE — 25000003 PHARM REV CODE 250: Performed by: STUDENT IN AN ORGANIZED HEALTH CARE EDUCATION/TRAINING PROGRAM

## 2024-01-09 PROCEDURE — 71000033 HC RECOVERY, INTIAL HOUR: Performed by: STUDENT IN AN ORGANIZED HEALTH CARE EDUCATION/TRAINING PROGRAM

## 2024-01-09 PROCEDURE — 88342 IMHCHEM/IMCYTCHM 1ST ANTB: CPT | Mod: 26,,, | Performed by: PATHOLOGY

## 2024-01-09 PROCEDURE — 88307 TISSUE EXAM BY PATHOLOGIST: CPT | Mod: 26,,, | Performed by: PATHOLOGY

## 2024-01-09 PROCEDURE — 63600175 PHARM REV CODE 636 W HCPCS: Performed by: STUDENT IN AN ORGANIZED HEALTH CARE EDUCATION/TRAINING PROGRAM

## 2024-01-09 PROCEDURE — 63600175 PHARM REV CODE 636 W HCPCS: Performed by: NURSE ANESTHETIST, CERTIFIED REGISTERED

## 2024-01-09 PROCEDURE — 88341 IMHCHEM/IMCYTCHM EA ADD ANTB: CPT | Mod: 59 | Performed by: PATHOLOGY

## 2024-01-09 PROCEDURE — 36000712 HC OR TIME LEV V 1ST 15 MIN: Performed by: STUDENT IN AN ORGANIZED HEALTH CARE EDUCATION/TRAINING PROGRAM

## 2024-01-09 PROCEDURE — 63600175 PHARM REV CODE 636 W HCPCS: Performed by: PHYSICIAN ASSISTANT

## 2024-01-09 PROCEDURE — C1729 CATH, DRAINAGE: HCPCS | Performed by: STUDENT IN AN ORGANIZED HEALTH CARE EDUCATION/TRAINING PROGRAM

## 2024-01-09 PROCEDURE — 81314 PDGFRA GENE: CPT | Performed by: PATHOLOGY

## 2024-01-09 PROCEDURE — 8E0W4CZ ROBOTIC ASSISTED PROCEDURE OF TRUNK REGION, PERCUTANEOUS ENDOSCOPIC APPROACH: ICD-10-PCS | Performed by: STUDENT IN AN ORGANIZED HEALTH CARE EDUCATION/TRAINING PROGRAM

## 2024-01-09 PROCEDURE — 88307 TISSUE EXAM BY PATHOLOGIST: CPT | Performed by: PATHOLOGY

## 2024-01-09 PROCEDURE — 36000713 HC OR TIME LEV V EA ADD 15 MIN: Performed by: STUDENT IN AN ORGANIZED HEALTH CARE EDUCATION/TRAINING PROGRAM

## 2024-01-09 PROCEDURE — 63600175 PHARM REV CODE 636 W HCPCS

## 2024-01-09 PROCEDURE — 37000009 HC ANESTHESIA EA ADD 15 MINS: Performed by: STUDENT IN AN ORGANIZED HEALTH CARE EDUCATION/TRAINING PROGRAM

## 2024-01-09 PROCEDURE — 81272 KIT GENE TARGETED SEQ ANALYS: CPT | Performed by: PATHOLOGY

## 2024-01-09 PROCEDURE — 27201423 OPTIME MED/SURG SUP & DEVICES STERILE SUPPLY: Performed by: STUDENT IN AN ORGANIZED HEALTH CARE EDUCATION/TRAINING PROGRAM

## 2024-01-09 PROCEDURE — D9220A PRA ANESTHESIA: Mod: ANES,,, | Performed by: ANESTHESIOLOGY

## 2024-01-09 PROCEDURE — 86850 RBC ANTIBODY SCREEN: CPT | Performed by: PHYSICIAN ASSISTANT

## 2024-01-09 PROCEDURE — 25000003 PHARM REV CODE 250

## 2024-01-09 PROCEDURE — D9220A PRA ANESTHESIA: Mod: CRNA,,, | Performed by: NURSE ANESTHETIST, CERTIFIED REGISTERED

## 2024-01-09 PROCEDURE — 63600175 PHARM REV CODE 636 W HCPCS: Performed by: ANESTHESIOLOGY

## 2024-01-09 PROCEDURE — 36415 COLL VENOUS BLD VENIPUNCTURE: CPT | Performed by: STUDENT IN AN ORGANIZED HEALTH CARE EDUCATION/TRAINING PROGRAM

## 2024-01-09 PROCEDURE — C9290 INJ, BUPIVACAINE LIPOSOME: HCPCS | Performed by: STUDENT IN AN ORGANIZED HEALTH CARE EDUCATION/TRAINING PROGRAM

## 2024-01-09 PROCEDURE — 94761 N-INVAS EAR/PLS OXIMETRY MLT: CPT

## 2024-01-09 PROCEDURE — 0DB54ZX EXCISION OF ESOPHAGUS, PERCUTANEOUS ENDOSCOPIC APPROACH, DIAGNOSTIC: ICD-10-PCS | Performed by: STUDENT IN AN ORGANIZED HEALTH CARE EDUCATION/TRAINING PROGRAM

## 2024-01-09 PROCEDURE — 71000015 HC POSTOP RECOV 1ST HR: Performed by: STUDENT IN AN ORGANIZED HEALTH CARE EDUCATION/TRAINING PROGRAM

## 2024-01-09 RX ORDER — IPRATROPIUM BROMIDE AND ALBUTEROL SULFATE 2.5; .5 MG/3ML; MG/3ML
3 SOLUTION RESPIRATORY (INHALATION)
Status: DISCONTINUED | OUTPATIENT
Start: 2024-01-09 | End: 2024-01-10

## 2024-01-09 RX ORDER — METOCLOPRAMIDE HYDROCHLORIDE 5 MG/ML
5 INJECTION INTRAMUSCULAR; INTRAVENOUS EVERY 6 HOURS PRN
Status: DISCONTINUED | OUTPATIENT
Start: 2024-01-09 | End: 2024-01-11 | Stop reason: HOSPADM

## 2024-01-09 RX ORDER — HALOPERIDOL 5 MG/ML
0.5 INJECTION INTRAMUSCULAR EVERY 10 MIN PRN
Status: DISCONTINUED | OUTPATIENT
Start: 2024-01-09 | End: 2024-01-10

## 2024-01-09 RX ORDER — POLYETHYLENE GLYCOL 3350 17 G/17G
17 POWDER, FOR SOLUTION ORAL DAILY
Status: DISCONTINUED | OUTPATIENT
Start: 2024-01-10 | End: 2024-01-09

## 2024-01-09 RX ORDER — KETAMINE HCL IN 0.9 % NACL 50 MG/5 ML
SYRINGE (ML) INTRAVENOUS
Status: DISCONTINUED | OUTPATIENT
Start: 2024-01-09 | End: 2024-01-09

## 2024-01-09 RX ORDER — DEXAMETHASONE SODIUM PHOSPHATE 4 MG/ML
INJECTION, SOLUTION INTRA-ARTICULAR; INTRALESIONAL; INTRAMUSCULAR; INTRAVENOUS; SOFT TISSUE
Status: DISCONTINUED | OUTPATIENT
Start: 2024-01-09 | End: 2024-01-09

## 2024-01-09 RX ORDER — LABETALOL HCL 20 MG/4 ML
10 SYRINGE (ML) INTRAVENOUS EVERY 10 MIN PRN
Status: DISCONTINUED | OUTPATIENT
Start: 2024-01-09 | End: 2024-01-10

## 2024-01-09 RX ORDER — DEXMEDETOMIDINE HYDROCHLORIDE 100 UG/ML
INJECTION, SOLUTION INTRAVENOUS
Status: DISCONTINUED | OUTPATIENT
Start: 2024-01-09 | End: 2024-01-09

## 2024-01-09 RX ORDER — OXYCODONE HYDROCHLORIDE 10 MG/1
10 TABLET ORAL EVERY 4 HOURS PRN
Status: DISCONTINUED | OUTPATIENT
Start: 2024-01-09 | End: 2024-01-09

## 2024-01-09 RX ORDER — LIDOCAINE HYDROCHLORIDE 10 MG/ML
1 INJECTION, SOLUTION EPIDURAL; INFILTRATION; INTRACAUDAL; PERINEURAL ONCE
Status: DISCONTINUED | OUTPATIENT
Start: 2024-01-09 | End: 2024-01-11 | Stop reason: HOSPADM

## 2024-01-09 RX ORDER — GABAPENTIN 300 MG/1
300 CAPSULE ORAL NIGHTLY
Status: DISCONTINUED | OUTPATIENT
Start: 2024-01-09 | End: 2024-01-09

## 2024-01-09 RX ORDER — LIDOCAINE HYDROCHLORIDE 20 MG/ML
INJECTION, SOLUTION EPIDURAL; INFILTRATION; INTRACAUDAL; PERINEURAL
Status: DISCONTINUED | OUTPATIENT
Start: 2024-01-09 | End: 2024-01-09

## 2024-01-09 RX ORDER — BISACODYL 10 MG/1
10 SUPPOSITORY RECTAL DAILY PRN
Status: DISCONTINUED | OUTPATIENT
Start: 2024-01-09 | End: 2024-01-11 | Stop reason: HOSPADM

## 2024-01-09 RX ORDER — FAMOTIDINE 10 MG/ML
20 INJECTION INTRAVENOUS 2 TIMES DAILY
Status: DISCONTINUED | OUTPATIENT
Start: 2024-01-09 | End: 2024-01-10

## 2024-01-09 RX ORDER — ACETAMINOPHEN 500 MG
1000 TABLET ORAL EVERY 8 HOURS
Status: DISCONTINUED | OUTPATIENT
Start: 2024-01-09 | End: 2024-01-09

## 2024-01-09 RX ORDER — LIDOCAINE 50 MG/G
1 PATCH TOPICAL
Status: DISCONTINUED | OUTPATIENT
Start: 2024-01-09 | End: 2024-01-11 | Stop reason: HOSPADM

## 2024-01-09 RX ORDER — FENTANYL CITRATE 50 UG/ML
25 INJECTION, SOLUTION INTRAMUSCULAR; INTRAVENOUS EVERY 5 MIN PRN
Status: DISCONTINUED | OUTPATIENT
Start: 2024-01-09 | End: 2024-01-10

## 2024-01-09 RX ORDER — HYDROMORPHONE HYDROCHLORIDE 2 MG/ML
INJECTION, SOLUTION INTRAMUSCULAR; INTRAVENOUS; SUBCUTANEOUS
Status: DISCONTINUED | OUTPATIENT
Start: 2024-01-09 | End: 2024-01-09

## 2024-01-09 RX ORDER — ONDANSETRON 8 MG/1
8 TABLET, ORALLY DISINTEGRATING ORAL EVERY 8 HOURS PRN
Status: DISCONTINUED | OUTPATIENT
Start: 2024-01-09 | End: 2024-01-11 | Stop reason: HOSPADM

## 2024-01-09 RX ORDER — ONDANSETRON HYDROCHLORIDE 2 MG/ML
4 INJECTION, SOLUTION INTRAVENOUS DAILY PRN
Status: DISCONTINUED | OUTPATIENT
Start: 2024-01-09 | End: 2024-01-11 | Stop reason: HOSPADM

## 2024-01-09 RX ORDER — HYDRALAZINE HYDROCHLORIDE 20 MG/ML
10 INJECTION INTRAMUSCULAR; INTRAVENOUS ONCE
Status: COMPLETED | OUTPATIENT
Start: 2024-01-09 | End: 2024-01-09

## 2024-01-09 RX ORDER — ACETAMINOPHEN 10 MG/ML
INJECTION, SOLUTION INTRAVENOUS
Status: DISCONTINUED | OUTPATIENT
Start: 2024-01-09 | End: 2024-01-09

## 2024-01-09 RX ORDER — OXYCODONE HCL 5 MG/5 ML
5 SOLUTION, ORAL ORAL EVERY 4 HOURS PRN
Status: DISCONTINUED | OUTPATIENT
Start: 2024-01-09 | End: 2024-01-10

## 2024-01-09 RX ORDER — EPHEDRINE SULFATE 50 MG/ML
INJECTION, SOLUTION INTRAVENOUS
Status: DISCONTINUED | OUTPATIENT
Start: 2024-01-09 | End: 2024-01-09

## 2024-01-09 RX ORDER — AMOXICILLIN 250 MG
1 CAPSULE ORAL DAILY
Status: DISCONTINUED | OUTPATIENT
Start: 2024-01-10 | End: 2024-01-09

## 2024-01-09 RX ORDER — ATORVASTATIN CALCIUM 10 MG/1
20 TABLET, FILM COATED ORAL DAILY
Status: CANCELLED | OUTPATIENT
Start: 2024-01-10

## 2024-01-09 RX ORDER — LABETALOL HCL 20 MG/4 ML
10 SYRINGE (ML) INTRAVENOUS ONCE
Status: COMPLETED | OUTPATIENT
Start: 2024-01-09 | End: 2024-01-09

## 2024-01-09 RX ORDER — FAMOTIDINE 20 MG/1
20 TABLET, FILM COATED ORAL 2 TIMES DAILY
Status: DISCONTINUED | OUTPATIENT
Start: 2024-01-09 | End: 2024-01-09

## 2024-01-09 RX ORDER — ONDANSETRON 2 MG/ML
INJECTION INTRAMUSCULAR; INTRAVENOUS
Status: DISCONTINUED | OUTPATIENT
Start: 2024-01-09 | End: 2024-01-09

## 2024-01-09 RX ORDER — HYDRALAZINE HYDROCHLORIDE 20 MG/ML
10 INJECTION INTRAMUSCULAR; INTRAVENOUS EVERY 10 MIN PRN
Status: DISCONTINUED | OUTPATIENT
Start: 2024-01-09 | End: 2024-01-10

## 2024-01-09 RX ORDER — PHENYLEPHRINE HYDROCHLORIDE 10 MG/ML
INJECTION INTRAVENOUS CONTINUOUS PRN
Status: DISCONTINUED | OUTPATIENT
Start: 2024-01-09 | End: 2024-01-09

## 2024-01-09 RX ORDER — ACETAMINOPHEN 650 MG/20.3ML
650 LIQUID ORAL EVERY 6 HOURS
Status: DISCONTINUED | OUTPATIENT
Start: 2024-01-09 | End: 2024-01-10

## 2024-01-09 RX ORDER — ACETAMINOPHEN 500 MG
1000 TABLET ORAL
Status: COMPLETED | OUTPATIENT
Start: 2024-01-09 | End: 2024-01-09

## 2024-01-09 RX ORDER — PHENYLEPHRINE HCL IN 0.9% NACL 1 MG/10 ML
SYRINGE (ML) INTRAVENOUS
Status: DISCONTINUED | OUTPATIENT
Start: 2024-01-09 | End: 2024-01-09

## 2024-01-09 RX ORDER — OXYCODONE HCL 5 MG/5 ML
10 SOLUTION, ORAL ORAL EVERY 4 HOURS PRN
Status: DISCONTINUED | OUTPATIENT
Start: 2024-01-09 | End: 2024-01-10

## 2024-01-09 RX ORDER — OXYCODONE HYDROCHLORIDE 5 MG/1
5 TABLET ORAL EVERY 4 HOURS PRN
Status: DISCONTINUED | OUTPATIENT
Start: 2024-01-09 | End: 2024-01-09

## 2024-01-09 RX ORDER — ACETAMINOPHEN 500 MG
1000 TABLET ORAL
Status: ACTIVE | OUTPATIENT
Start: 2024-01-09 | End: 2024-01-09

## 2024-01-09 RX ORDER — ROCURONIUM BROMIDE 10 MG/ML
INJECTION, SOLUTION INTRAVENOUS
Status: DISCONTINUED | OUTPATIENT
Start: 2024-01-09 | End: 2024-01-09

## 2024-01-09 RX ORDER — METHOCARBAMOL 500 MG/1
500 TABLET, FILM COATED ORAL 4 TIMES DAILY
Status: DISCONTINUED | OUTPATIENT
Start: 2024-01-09 | End: 2024-01-09

## 2024-01-09 RX ORDER — FENTANYL CITRATE 50 UG/ML
INJECTION, SOLUTION INTRAMUSCULAR; INTRAVENOUS
Status: DISCONTINUED | OUTPATIENT
Start: 2024-01-09 | End: 2024-01-09

## 2024-01-09 RX ORDER — PROPOFOL 10 MG/ML
VIAL (ML) INTRAVENOUS
Status: DISCONTINUED | OUTPATIENT
Start: 2024-01-09 | End: 2024-01-09

## 2024-01-09 RX ORDER — HYDROMORPHONE HYDROCHLORIDE 1 MG/ML
0.2 INJECTION, SOLUTION INTRAMUSCULAR; INTRAVENOUS; SUBCUTANEOUS EVERY 5 MIN PRN
Status: DISCONTINUED | OUTPATIENT
Start: 2024-01-09 | End: 2024-01-10

## 2024-01-09 RX ORDER — BUPIVACAINE HYDROCHLORIDE 2.5 MG/ML
INJECTION, SOLUTION EPIDURAL; INFILTRATION; INTRACAUDAL
Status: DISCONTINUED | OUTPATIENT
Start: 2024-01-09 | End: 2024-01-09 | Stop reason: HOSPADM

## 2024-01-09 RX ADMIN — HYDROMORPHONE HYDROCHLORIDE 0.4 MG: 2 INJECTION INTRAMUSCULAR; INTRAVENOUS; SUBCUTANEOUS at 06:01

## 2024-01-09 RX ADMIN — Medication 200 MCG: at 02:01

## 2024-01-09 RX ADMIN — ROCURONIUM BROMIDE 10 MG: 10 INJECTION INTRAVENOUS at 04:01

## 2024-01-09 RX ADMIN — Medication 100 MCG: at 03:01

## 2024-01-09 RX ADMIN — ACETAMINOPHEN 1000 MG: 10 INJECTION, SOLUTION INTRAVENOUS at 05:01

## 2024-01-09 RX ADMIN — EPHEDRINE SULFATE 10 MG: 50 INJECTION INTRAVENOUS at 05:01

## 2024-01-09 RX ADMIN — FENTANYL CITRATE 50 MCG: 50 INJECTION INTRAMUSCULAR; INTRAVENOUS at 04:01

## 2024-01-09 RX ADMIN — LABETALOL HYDROCHLORIDE 10 MG: 5 INJECTION, SOLUTION INTRAVENOUS at 07:01

## 2024-01-09 RX ADMIN — Medication 10 MG: at 05:01

## 2024-01-09 RX ADMIN — CEFAZOLIN 2 G: 2 INJECTION, POWDER, FOR SOLUTION INTRAMUSCULAR; INTRAVENOUS at 10:01

## 2024-01-09 RX ADMIN — ROCURONIUM BROMIDE 100 MG: 10 INJECTION INTRAVENOUS at 02:01

## 2024-01-09 RX ADMIN — ONDANSETRON 4 MG: 2 INJECTION INTRAMUSCULAR; INTRAVENOUS at 05:01

## 2024-01-09 RX ADMIN — SODIUM CHLORIDE: 0.9 INJECTION, SOLUTION INTRAVENOUS at 01:01

## 2024-01-09 RX ADMIN — FAMOTIDINE 20 MG: 10 INJECTION, SOLUTION INTRAVENOUS at 11:01

## 2024-01-09 RX ADMIN — LIDOCAINE HYDROCHLORIDE 100 MG: 20 INJECTION, SOLUTION EPIDURAL; INFILTRATION; INTRACAUDAL at 02:01

## 2024-01-09 RX ADMIN — Medication 20 MG: at 02:01

## 2024-01-09 RX ADMIN — PHENYLEPHRINE HYDROCHLORIDE 0.25 MCG/KG/MIN: 10 INJECTION INTRAVENOUS at 03:01

## 2024-01-09 RX ADMIN — Medication 100 MCG: at 02:01

## 2024-01-09 RX ADMIN — CEFAZOLIN 2 G: 2 INJECTION, POWDER, FOR SOLUTION INTRAMUSCULAR; INTRAVENOUS at 02:01

## 2024-01-09 RX ADMIN — PROPOFOL 50 MG: 10 INJECTION, EMULSION INTRAVENOUS at 02:01

## 2024-01-09 RX ADMIN — Medication 200 MCG: at 03:01

## 2024-01-09 RX ADMIN — PROPOFOL 180 MG: 10 INJECTION, EMULSION INTRAVENOUS at 02:01

## 2024-01-09 RX ADMIN — SUGAMMADEX 200 MG: 100 INJECTION, SOLUTION INTRAVENOUS at 05:01

## 2024-01-09 RX ADMIN — SODIUM CHLORIDE, SODIUM GLUCONATE, SODIUM ACETATE, POTASSIUM CHLORIDE, MAGNESIUM CHLORIDE, SODIUM PHOSPHATE, DIBASIC, AND POTASSIUM PHOSPHATE: .53; .5; .37; .037; .03; .012; .00082 INJECTION, SOLUTION INTRAVENOUS at 04:01

## 2024-01-09 RX ADMIN — ACETAMINOPHEN 650 MG: 650 SOLUTION ORAL at 11:01

## 2024-01-09 RX ADMIN — LIDOCAINE 1 PATCH: 50 PATCH CUTANEOUS at 07:01

## 2024-01-09 RX ADMIN — HYDRALAZINE HYDROCHLORIDE 10 MG: 20 INJECTION, SOLUTION INTRAMUSCULAR; INTRAVENOUS at 09:01

## 2024-01-09 RX ADMIN — Medication 10 MG: at 04:01

## 2024-01-09 RX ADMIN — LABETALOL HYDROCHLORIDE 10 MG: 5 INJECTION, SOLUTION INTRAVENOUS at 10:01

## 2024-01-09 RX ADMIN — GLYCOPYRROLATE 0.2 MG: 0.2 INJECTION INTRAMUSCULAR; INTRAVENOUS at 02:01

## 2024-01-09 RX ADMIN — DEXAMETHASONE SODIUM PHOSPHATE 8 MG: 4 INJECTION INTRA-ARTICULAR; INTRALESIONAL; INTRAMUSCULAR; INTRAVENOUS; SOFT TISSUE at 02:01

## 2024-01-09 RX ADMIN — GABAPENTIN 400 MG: 300 CAPSULE ORAL at 09:01

## 2024-01-09 RX ADMIN — FENTANYL CITRATE 50 MCG: 50 INJECTION INTRAMUSCULAR; INTRAVENOUS at 05:01

## 2024-01-09 RX ADMIN — ROCURONIUM BROMIDE 10 MG: 10 INJECTION INTRAVENOUS at 05:01

## 2024-01-09 RX ADMIN — ACETAMINOPHEN 1000 MG: 500 TABLET ORAL at 09:01

## 2024-01-09 RX ADMIN — FENTANYL CITRATE 100 MCG: 50 INJECTION INTRAMUSCULAR; INTRAVENOUS at 02:01

## 2024-01-09 RX ADMIN — IPRATROPIUM BROMIDE AND ALBUTEROL SULFATE 3 ML: .5; 3 SOLUTION RESPIRATORY (INHALATION) at 07:01

## 2024-01-09 RX ADMIN — DEXMEDETOMIDINE 12 MCG: 100 INJECTION, SOLUTION, CONCENTRATE INTRAVENOUS at 05:01

## 2024-01-09 RX ADMIN — DEXMEDETOMIDINE 8 MCG: 100 INJECTION, SOLUTION, CONCENTRATE INTRAVENOUS at 06:01

## 2024-01-09 NOTE — PROGRESS NOTES
Patient was prepared for surgery.    BP is very high -had last dose of Lisinopril yesterday- and he is very nervous about being in the hospital. Still high upon recheck- Dr Mayfield was notified.  TORB for a 1 time dose of 10 mg IV Hydralazine.

## 2024-01-09 NOTE — ANESTHESIA PROCEDURE NOTES
Arterial    Diagnosis: Mediastinal mass    Patient location during procedure: done in OR    Staffing  Authorizing Provider: Kandi Mayfield MD  Performing Provider: Anderson Pacheco MD    Staffing  Performed by: Anderson Pacheco MD  Authorized by: Kandi Mayfield MD    Anesthesiologist was present at the time of the procedure.    Preanesthetic Checklist  Completed: patient identified, IV checked, site marked, risks and benefits discussed, surgical consent, monitors and equipment checked, pre-op evaluation, timeout performed and anesthesia consent givenArterial  Skin Prep: chlorhexidine gluconate  Local Infiltration: none  Orientation: right  Location: radial    Catheter Size: 20 G  Catheter placement by Anatomical landmarks. Heme positive aspiration all ports. Insertion Attempts: 1  Assessment  Dressing: secured with tape and tegaderm  Patient: Tolerated well

## 2024-01-09 NOTE — H&P
[facility] - Surgery ([facility])  Cardiothoracic Surgery  History & Physical    Patient Name: Clarence Alberto  MRN: [id number]  Admission Date: 1/9/2024  Attending Physician: Forest Orozco MD  Referring Provider: Forest Orozco MD    Patient information was obtained from patient and past medical records.     Subjective:     Chief Complaint/Reason for Admission: Surgery    History of Present Illness: Patient is a 68 y.o. male with HTN, HLD, and posterior mediastinal mass. He was seen in clinic and plan was made to proceed with pre-operative work up for eventual mass excision today. He was discussed at tumor board. He has obtained repeat CT chest and EUS as well as pulmonary evaluation and repeat echo. He presents today for surgery.       He has no new complaints or changes in health since prior visit.        No current facility-administered medications on file prior to encounter.     Current Outpatient Medications on File Prior to Encounter   Medication Sig    atorvastatin (LIPITOR) 20 MG tablet = 1 tab, Oral, Daily, # 90 tab, 0 Refill(s), Maintenance, Pharmacy: [id number], 183, cm, 09/12/23 12:39:00 CDT, Height/Length Measured, 86.2, kg, 09/12/23 12:47:00 CDT, Weight Dosing    lisinopriL (PRINIVIL,ZESTRIL) 40 MG tablet Take 40 mg by mouth once daily.    om-3-dha-epa-fish-D3-saw-herbs (PROSTATE OPTIMIZER) 6.6 mg-100 mg- 200 mg-5 mcg Cap Take 1 capsule by mouth Daily.    albuterol (PROVENTIL/VENTOLIN HFA) 90 mcg/actuation inhaler Inhale 2 puffs into the lungs every 4 (four) hours as needed.    SYMBICORT 160-4.5 mcg/actuation HFAA Inhale 2 puffs into the lungs 2 (two) times daily.       Review of patient's allergies indicates:  No Known Allergies    No past medical history on file.  Past Surgical History:   Procedure Laterality Date    ENDOSCOPIC ULTRASOUND OF UPPER GASTROINTESTINAL TRACT N/A 12/29/2023    Procedure: ULTRASOUND, UPPER GI TRACT, ENDOSCOPIC;  Surgeon: Pierre Turpin MD;  Location: [facility]  (2ND FLR);  Service: Endoscopy;  Laterality: N/A;  12/27/23: instructions sent via portal- GD    ESOPHAGOGASTRODUODENOSCOPY N/A 12/29/2023    Procedure: EGD (ESOPHAGOGASTRODUODENOSCOPY);  Surgeon: Pierre Turpin MD;  Location: [facility] (2ND FLR);  Service: Endoscopy;  Laterality: N/A;     Family History    None       Tobacco Use    Smoking status: Never    Smokeless tobacco: Not on file   Substance and Sexual Activity    Alcohol use: Yes    Drug use: Not on file    Sexual activity: Not on file     Review of Systems   All other systems reviewed and are negative.    Objective:     Vital Signs (Most Recent):    Vital Signs (24h Range):           There is no height or weight on file to calculate BMI.                   Physical Exam  Vitals and nursing note reviewed.   Constitutional:       Appearance: Normal appearance.   HENT:      Head: Normocephalic and atraumatic.   Cardiovascular:      Rate and Rhythm: Normal rate and regular rhythm.   Pulmonary:      Effort: Pulmonary effort is normal. No respiratory distress.   Abdominal:      General: Abdomen is flat. There is no distension.      Palpations: Abdomen is soft. There is no mass.      Tenderness: There is no abdominal tenderness.      Hernia: No hernia is present.   Musculoskeletal:      Right lower leg: No edema.      Left lower leg: No edema.   Skin:     General: Skin is warm and dry.   Neurological:      General: No focal deficit present.      Mental Status: He is alert and oriented to person, place, and time.   Psychiatric:         Mood and Affect: Mood normal.         Behavior: Behavior normal.         Significant Labs:  Reviewed      Significant Diagnostics:  CT chest, EUS reviewed      Assessment/Plan:     Patient is a 68 y.o. male with HTN, HLD, and posterior mediastinal mass.concerning for bronchogenic cyst vs esophageal duplication cyst. Favor bronchogenic cyst given history of URI. Discussed with Mr Loco that although small there is a small risk  that this represents a malignant process but that biopsy is not advised given the more likelihood that this is a cyst. Also discussed with him that the main reason to operate is to avoid the cyst from becoming a complicated cyst. (Rupture, hemoptysis, etc)     Plan to proceed with RATS and medistinal mass excision, possible bronchoscopy, possible EGD. Consent obtained.     Fidelina Cornell MD  Cardiothoracic Surgery  [facility] - Surgery (2nd Fl)

## 2024-01-10 PROBLEM — K22.89: Status: ACTIVE | Noted: 2024-01-10

## 2024-01-10 LAB — POCT GLUCOSE: 107 MG/DL (ref 70–110)

## 2024-01-10 PROCEDURE — 25500020 PHARM REV CODE 255: Performed by: STUDENT IN AN ORGANIZED HEALTH CARE EDUCATION/TRAINING PROGRAM

## 2024-01-10 PROCEDURE — 63600175 PHARM REV CODE 636 W HCPCS: Performed by: STUDENT IN AN ORGANIZED HEALTH CARE EDUCATION/TRAINING PROGRAM

## 2024-01-10 PROCEDURE — 25000003 PHARM REV CODE 250: Performed by: STUDENT IN AN ORGANIZED HEALTH CARE EDUCATION/TRAINING PROGRAM

## 2024-01-10 PROCEDURE — 63600175 PHARM REV CODE 636 W HCPCS

## 2024-01-10 PROCEDURE — 25000003 PHARM REV CODE 250

## 2024-01-10 PROCEDURE — 20600001 HC STEP DOWN PRIVATE ROOM

## 2024-01-10 RX ORDER — IPRATROPIUM BROMIDE AND ALBUTEROL SULFATE 2.5; .5 MG/3ML; MG/3ML
3 SOLUTION RESPIRATORY (INHALATION) EVERY 6 HOURS PRN
Status: DISCONTINUED | OUTPATIENT
Start: 2024-01-10 | End: 2024-01-11 | Stop reason: HOSPADM

## 2024-01-10 RX ORDER — FAMOTIDINE 20 MG/1
20 TABLET, FILM COATED ORAL 2 TIMES DAILY
Status: DISCONTINUED | OUTPATIENT
Start: 2024-01-10 | End: 2024-01-11 | Stop reason: HOSPADM

## 2024-01-10 RX ORDER — METHOCARBAMOL 500 MG/1
500 TABLET, FILM COATED ORAL 4 TIMES DAILY
Status: DISCONTINUED | OUTPATIENT
Start: 2024-01-10 | End: 2024-01-11 | Stop reason: HOSPADM

## 2024-01-10 RX ORDER — HYDRALAZINE HYDROCHLORIDE 20 MG/ML
10 INJECTION INTRAMUSCULAR; INTRAVENOUS EVERY 6 HOURS PRN
Status: DISCONTINUED | OUTPATIENT
Start: 2024-01-10 | End: 2024-01-11 | Stop reason: HOSPADM

## 2024-01-10 RX ORDER — ATORVASTATIN CALCIUM 20 MG/1
20 TABLET, FILM COATED ORAL DAILY
Status: DISCONTINUED | OUTPATIENT
Start: 2024-01-11 | End: 2024-01-11 | Stop reason: HOSPADM

## 2024-01-10 RX ORDER — ACETAMINOPHEN 500 MG
1000 TABLET ORAL EVERY 8 HOURS
Status: DISCONTINUED | OUTPATIENT
Start: 2024-01-10 | End: 2024-01-11 | Stop reason: HOSPADM

## 2024-01-10 RX ORDER — LISINOPRIL 20 MG/1
40 TABLET ORAL DAILY
Status: DISCONTINUED | OUTPATIENT
Start: 2024-01-11 | End: 2024-01-11 | Stop reason: HOSPADM

## 2024-01-10 RX ORDER — OXYCODONE HYDROCHLORIDE 10 MG/1
10 TABLET ORAL EVERY 4 HOURS PRN
Status: DISCONTINUED | OUTPATIENT
Start: 2024-01-10 | End: 2024-01-11 | Stop reason: HOSPADM

## 2024-01-10 RX ORDER — OXYCODONE HYDROCHLORIDE 5 MG/1
5 TABLET ORAL EVERY 4 HOURS PRN
Status: DISCONTINUED | OUTPATIENT
Start: 2024-01-10 | End: 2024-01-11 | Stop reason: HOSPADM

## 2024-01-10 RX ORDER — HYDRALAZINE HYDROCHLORIDE 20 MG/ML
10 INJECTION INTRAMUSCULAR; INTRAVENOUS ONCE
Status: COMPLETED | OUTPATIENT
Start: 2024-01-10 | End: 2024-01-10

## 2024-01-10 RX ADMIN — ACETAMINOPHEN 1000 MG: 500 TABLET ORAL at 09:01

## 2024-01-10 RX ADMIN — FAMOTIDINE 20 MG: 10 INJECTION, SOLUTION INTRAVENOUS at 08:01

## 2024-01-10 RX ADMIN — METHOCARBAMOL 500 MG: 100 INJECTION, SOLUTION INTRAMUSCULAR; INTRAVENOUS at 12:01

## 2024-01-10 RX ADMIN — FAMOTIDINE 20 MG: 20 TABLET, FILM COATED ORAL at 08:01

## 2024-01-10 RX ADMIN — METHOCARBAMOL 500 MG: 100 INJECTION, SOLUTION INTRAMUSCULAR; INTRAVENOUS at 05:01

## 2024-01-10 RX ADMIN — METHOCARBAMOL 500 MG: 500 TABLET ORAL at 02:01

## 2024-01-10 RX ADMIN — IOHEXOL 80 ML: 350 INJECTION, SOLUTION INTRAVENOUS at 09:01

## 2024-01-10 RX ADMIN — HYDRALAZINE HYDROCHLORIDE 10 MG: 20 INJECTION INTRAMUSCULAR; INTRAVENOUS at 04:01

## 2024-01-10 RX ADMIN — ACETAMINOPHEN 650 MG: 650 SOLUTION ORAL at 05:01

## 2024-01-10 RX ADMIN — CEFAZOLIN 2 G: 2 INJECTION, POWDER, FOR SOLUTION INTRAMUSCULAR; INTRAVENOUS at 05:01

## 2024-01-10 RX ADMIN — ACETAMINOPHEN 1000 MG: 500 TABLET ORAL at 02:01

## 2024-01-10 RX ADMIN — HYDRALAZINE HYDROCHLORIDE 10 MG: 20 INJECTION, SOLUTION INTRAMUSCULAR; INTRAVENOUS at 08:01

## 2024-01-10 RX ADMIN — METHOCARBAMOL 500 MG: 500 TABLET ORAL at 04:01

## 2024-01-10 RX ADMIN — METHOCARBAMOL 500 MG: 500 TABLET ORAL at 08:01

## 2024-01-10 NOTE — SUBJECTIVE & OBJECTIVE
Interval History: No acute events overnight. Pain well controlled. No air leak. No complaints.      Medications:  Continuous Infusions:  Scheduled Meds:   acetaminophen  650 mg Oral Q6H    albuterol-ipratropium  3 mL Nebulization Q6H WAKE    famotidine (PF)  20 mg Intravenous BID    LIDOcaine (PF) 10 mg/ml (1%)  1 mL Intradermal Once    LIDOcaine  1 patch Transdermal Q24H    methocarbamol (ROBAXIN) IVPB  500 mg Intravenous Q8H     PRN Meds:bisacodyL, fentaNYL, haloperidol lactate, hydrALAZINE, HYDROmorphone, labetalol, metoclopramide, ondansetron, ondansetron, oxyCODONE, oxyCODONE     Objective:     Vital Signs (Most Recent):  Temp: 98.1 °F (36.7 °C) (01/10/24 0806)  Pulse: 61 (01/10/24 0806)  Resp: 18 (01/10/24 0806)  BP: (!) 143/72 (01/10/24 0806)  SpO2: 97 % (01/10/24 0806) Vital Signs (24h Range):  Temp:  [96 °F (35.6 °C)-98.3 °F (36.8 °C)] 98.1 °F (36.7 °C)  Pulse:  [54-77] 61  Resp:  [12-37] 18  SpO2:  [94 %-100 %] 97 %  BP: (136-196)/() 143/72     Weight: 84.8 kg (187 lb)  Body mass index is 25.36 kg/m².    SpO2: 97 %       Intake/Output - Last 3 Shifts         01/08 0700  01/09 0659 01/09 0700  01/10 0659 01/10 0700  01/11 0659    I.V. (mL/kg)  1000 (11.8)     IV Piggyback  2150     Total Intake(mL/kg)  3150 (37.1)     Urine (mL/kg/hr)  775     Drains  52.5     Chest Tube  165     Total Output  992.5     Net  +2157.5                    Lines/Drains/Airways       Drain  Duration                  Chest Tube 01/09/24 1732 Tube - 1 Right Pleural 24 Fr. <1 day         Closed/Suction Drain 01/09/24 1732 Tube - 1 Right Chest Bulb 10 Fr. <1 day              Peripheral Intravenous Line  Duration                  Peripheral IV - Single Lumen 01/09/24 0840 18 G Anterior;Right Forearm 1 day         Peripheral IV - Single Lumen 01/09/24 1417 18 G Left Hand <1 day                     Physical Exam  Vitals reviewed.   Constitutional:       Appearance: Normal appearance.   Cardiovascular:      Rate and Rhythm: Normal  rate and regular rhythm.   Pulmonary:      Effort: Pulmonary effort is normal. No respiratory distress.   Abdominal:      General: There is no distension.   Skin:     General: Skin is warm and dry.   Neurological:      General: No focal deficit present.      Mental Status: He is alert and oriented to person, place, and time.   Psychiatric:         Thought Content: Thought content normal.            Significant Labs:  All pertinent labs from the last 24 hours have been reviewed.    Significant Diagnostics:  I have reviewed all pertinent imaging results/findings within the past 24 hours.

## 2024-01-10 NOTE — TRANSFER OF CARE
Anesthesia Transfer of Care Note    Patient: Clarence Alberto    Procedure(s) Performed: Procedure(s) (LRB):  XI ROBOTIC ESOPHAGEAL MASS RESECTION (Right)  BRONCHOSCOPY (N/A)  EGD (ESOPHAGOGASTRODUODENOSCOPY) (N/A)  BLOCK, NERVE, INTERCOSTAL, 2 OR MORE (Right)    Patient location: PACU    Anesthesia Type: general    Transport from OR: Transported from OR on 6-10 L/min O2 by face mask with adequate spontaneous ventilation    Post pain: adequate analgesia    Post assessment: no apparent anesthetic complications and tolerated procedure well    Post vital signs: stable    Level of consciousness: sedated    Nausea/Vomiting: no nausea/vomiting    Complications: none    Transfer of care protocol was followed      Last vitals: Visit Vitals  BP (!) 164/90   Pulse (!) 54   Temp 36.8 °C (98.2 °F) (Temporal)   Resp 14   Ht 6' (1.829 m)   Wt 84.8 kg (187 lb)   SpO2 97%   BMI 25.36 kg/m²

## 2024-01-10 NOTE — ANESTHESIA POSTPROCEDURE EVALUATION
Anesthesia Post Evaluation    Patient: Clarence Alberto    Procedure(s) Performed: Procedure(s) (LRB):  XI ROBOTIC ESOPHAGEAL MASS RESECTION (Right)  BRONCHOSCOPY (N/A)  EGD (ESOPHAGOGASTRODUODENOSCOPY) (N/A)  BLOCK, NERVE, INTERCOSTAL, 2 OR MORE (Right)    Final Anesthesia Type: general      Patient location during evaluation: PACU  Patient participation: Yes- Able to Participate  Level of consciousness: awake and alert  Post-procedure vital signs: reviewed and stable  Pain management: adequate  Airway patency: patent    PONV status at discharge: No PONV  Anesthetic complications: no      Cardiovascular status: blood pressure returned to baseline  Respiratory status: unassisted  Hydration status: euvolemic  Follow-up not needed.              Vitals Value Taken Time   /80 01/10/24 1106   Temp 36.4 °C (97.5 °F) 01/10/24 1106   Pulse 58 01/10/24 1108   Resp 18 01/10/24 1106   SpO2 98 % 01/10/24 1106         Event Time   Out of Recovery 19:00:00         Pain/Joey Score: Pain Rating Prior to Med Admin: 1 (1/10/2024  5:23 AM)  Joey Score: 9 (1/9/2024  7:00 PM)

## 2024-01-10 NOTE — OP NOTE
OPERATIVE REPORT  [facility]    DATE OF PROCEDURE: 1/9/2024     PREOPERATIVE DIAGNOSES:   Mediastinal mass [J98.59]    POSTOPERATIVE DIAGNOSES:   Mediastinal mass [J98.59]    PROCEDURES PERFORMED:   Procedure(s) (LRB):  XI ROBOTIC ESOPHAGEAL MASS RESECTION (Right)  BRONCHOSCOPY (N/A)  EGD (ESOPHAGOGASTRODUODENOSCOPY) (N/A)  BLOCK, NERVE, INTERCOSTAL, 2 OR MORE (Right)    Surgeon(s) and Role:     * Forest Orozco MD - Primary     * Blake Suarez DO - Fellow    ANESTHESIA: General    INDICATION: 68-year-old male with a posterior mediastinal mass. His case was discussed at a multidisciplinary tumor board. Workup was concerning for an esophageal leiomyoma and the decision was made to proceed with operative resection.     FINDINGS: 5 cm esophageal mass arising from within the muscular layers of the esophagus.     DESCRIPTION OF PROCEDURE:   Patient was taken to the operating room and placed supine. General anesthesia was induced and double-lumen endotracheal intubation was performed uneventfully. A procedural timeout was performed followed by on table bronchoscopy.  The scope was easily passed down the bronchial and tracheal limbs of the ET tube with no luminal irregularities noted. Upper endoscopy was then performed. The endoscope was passed through the oropharynx and down through the esophagus uneventfully with easy passage into the stomach. Slow withdrawal of the scope revealed no intraluminal masses or other concerning findings. The scope was then advanced and left in the stomach with insufflation and light turned off.  The patient was then repositioned in left lateral decubitus position with the bed flexed. SCDs and warming blankets applied. Preoperative antibiotics were confirmed to have been administered. Patient was prepped and draped in the usual sterile fashion. An operative timeout was performed and all present were in agreement.    Single lung ventilation was established.  An 8 mm robotic  trocar was placed at the right 8th intercostal space posterior axillary line for uneventful entry into the right hemithorax.  Three additional 8 mm robotic ports were placed equidistant along the 8th intercostal space under direct visualization.  A 12 mm assist port was placed in the 10th intercostal space.  The robotic surgical cart was docked.  Survey of the right hemithorax revealed an obvious 5 cm mid esophageal mass within the posterior mediastinum inferior to the azygous vein crossover.      The right lower lung lobe was elevated and inferior pulmonary ligament taken down with bipolar energy.  With the lung retracted anteriorly, dissection was carried cephalad opening the posterior mediastinal pleura.  The vagus nerve was identified and protected throughout dissection.  Parietal pleura was cleared from the surface of the esophageal mass until normal appearing esophagus was identified superiorly and inferiorly to the mass. The Longitudinal muscle fibers overlying the mass were visibly frayed, but were carefully split followed by dissection through the circular layer with a combination of blunt and bipolar energy dissection.  During this process, the well-defined esophageal mass was noted to arise from within the muscular layers of the esophagus.  The mass was circumferentially dissected from the muscularis propria and submucosal layer taking care to identify and preserve the underlying mucosa.  Mobilization of the mass was completed by hemostatic ligation and division of an obvious muscular stalk with bipolar energy.  Hemostasis was ensured throughout dissection.      A sterile saline leak test was performed under combined endoscopic visualization with the previously used endoscope as well as robotic visualization and found to be negative for mucosal disruption.  The endoscope was removed from the esophagus following the leak test.  The muscular layers of the esophagus were then loosely reapproximated with  interrupted Vicryl sutures.  The assist port was removed and incision slightly extended with electrocautery for removal of the specimen in an endoscopic retrieval bag, which was then sent to pathology.  The robot was undocked and a multi-level intercostal nerve block was performed with Exparel-Marcaine under direct visualization.  A 10F KAIDEN drain was brought in through the mid axillary port site and positioned directly overlying the esophageal dissection bed.  A 24 [place] Ulices was brought in through the anterior most port site and positioned posteriorly with apical direction.  Ventilation to the right lung was resumed under direct visualization with no abnormalities noted.  Remaining port sites were closed in layers with overlying sterile dressings applied.  This concluded the operation.    All procedural counts were correct at the end of the case. Patient tolerated the operation well, emerged from anesthesia uneventfully, and transferred to the PACU in stable condition.     Dr. Orozco was present and directed all critical portions of the operation.    ESTIMATED BLOOD LOSS: 100 cc    SPECIMENS:   Specimen (24h ago, onward)       Start     Ordered    01/09/24 1742  Specimen to Pathology, Surgery Pulmonary and Thoracic  Once        Comments: Pre-op Diagnosis: Mediastinal mass [J98.59]Procedure(s):XI ROBOTIC ESOPHAGEAL MASS RESECTIONBRONCHOSCOPYEGD (ESOPHAGOGASTRODUODENOSCOPY)BLOCK, NERVE, INTERCOSTAL, 2 OR MORE Number of specimens: 1Name of specimens: 1. Esophageal mass- perm     References:    Click here for ordering Quick Tip   Question Answer Comment   Procedure Type: Pulmonary and Thoracic    Specimen Class: Routine/Screening    Which provider would you like to cc? TAM OROZCO    Release to patient Immediate        01/09/24 1741                    COMPLICATIONS: None immediately apparent

## 2024-01-10 NOTE — ASSESSMENT & PLAN NOTE
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     Esophagram this morning, NPO until further directed by result  Multimodal analgesics  Encouraged ambulation and use of IS  Chest tube to water seal  Potential chest tube removal and DC this afternoon vs tomorrow

## 2024-01-10 NOTE — PLAN OF CARE
[facility]  Initial Discharge Assessment       Primary Care Provider: Emilia, Primary Doctor    Admission Diagnosis: Mediastinal mass [J98.59]  Esophageal mass [K22.89]    Admission Date: 1/9/2024  Expected Discharge Date: 1/11/2024    Transition of Care Barriers: None    Payor: MEDICARE / Plan: MEDICARE PART A & B / Product Type: Government /     Extended Emergency Contact Information  Primary Emergency Contact: DollyRobert  Mobile Phone: [phone]  Relation: Spouse  Preferred language: English   needed? No    Discharge Plan A: Home with family  Discharge Plan B: Home with family    No Pharmacies Listed    Initial Assessment (most recent)       Adult Discharge Assessment - 01/10/24 1437          Discharge Assessment    Assessment Type Discharge Planning Assessment     Confirmed/corrected address, phone number and insurance Yes     Confirmed Demographics Correct on Facesheet     Source of Information patient     Communicated LISSETH with patient/caregiver Date not available/Unable to determine     Reason For Admission Subepithelial esophageal mass     People in Home spouse     Facility Arrived From: home     Do you expect to return to your current living situation? Yes     Do you have help at home or someone to help you manage your care at home? Yes     Who are your caregiver(s) and their phone number(s)? Robert Alberto (spouse) [phone]     Prior to hospitilization cognitive status: Alert/Oriented     Current cognitive status: Alert/Oriented     Walking or Climbing Stairs Difficulty no     Dressing/Bathing Difficulty no     Equipment Currently Used at Home none     Readmission within 30 days? No     Patient currently being followed by outpatient case management? No     Do you currently have service(s) that help you manage your care at home? No     Do you take prescription medications? Yes     Do you have prescription coverage? Yes     Coverage Medicare [employer] and [place]      Do you have any problems affording any of your prescribed medications? No     Is the patient taking medications as prescribed? yes     Who is going to help you get home at discharge? Robert Alberto (spouse) [phone]     How do you get to doctors appointments? family or friend will provide;car, drives self     Are you on dialysis? No     Do you take coumadin? No     Discharge Plan A Home with family     Discharge Plan B Home with family     DME Needed Upon Discharge  none     Discharge Plan discussed with: Patient     Transition of Care Barriers None        Physical Activity    On average, how many days per week do you engage in moderate to strenuous exercise (like a brisk walk)? 5 days     On average, how many minutes do you engage in exercise at this level? 60 min        Financial Resource Strain    How hard is it for you to pay for the very basics like food, housing, medical care, and heating? Not hard at all        Housing Stability    In the last 12 months, was there a time when you were not able to pay the mortgage or rent on time? No     In the last 12 months, how many places have you lived? 1     In the last 12 months, was there a time when you did not have a steady place to sleep or slept in a shelter (including now)? No        Transportation Needs    In the past 12 months, has lack of transportation kept you from medical appointments or from getting medications? No     In the past 12 months, has lack of transportation kept you from meetings, work, or from getting things needed for daily living? No        Food Insecurity    Within the past 12 months, you worried that your food would run out before you got the money to buy more. Never true     Within the past 12 months, the food you bought just didn't last and you didn't have money to get more. Never true        Stress    Do you feel stress - tense, restless, nervous, or anxious, or unable to sleep at night because your mind is troubled all the time -  these days? Not at all        Social Connections    In a typical week, how many times do you talk on the phone with family, friends, or neighbors? More than three times a week     How often do you get together with friends or relatives? More than three times a week     How often do you attend Quaker or Jainism services? Never     Do you belong to any clubs or organizations such as Quaker groups, unions, fraternal or athletic groups, or school groups? No     How often do you attend meetings of the clubs or organizations you belong to? Never     Are you , , , , never , or living with a partner?         Alcohol Use    Q1: How often do you have a drink containing alcohol? 4 or more times a week     Q2: How many drinks containing alcohol do you have on a typical day when you are drinking? 1 or 2     Q3: How often do you have six or more drinks on one occasion? Never        OTHER    Name(s) of People in Home Robert Alberto (spouse) [phone]                        CM met with the patient at the bedside and discussed the discharge plan. Gave him the discharge booklet and placed contact numbers on the white board in the room. Patient alert and sitting up in bed.  Patient verified his name , , PCP, Insurance and Pharmacy . Stated he lives with Robert Alberto (spouse) [phone]  in a single story house and has 1 step to point of entry . He is not on coumadin nor is he a dialysis patient and had no HH. Stated he has no DME's .  He takes  medication as prescribed and has no problems getting  medication. He is interested in [facility].       Discharge Plan A and Plan B have been determined by review of patient's clinical status, future medical and therapeutic needs, and coverage/benefits for post-acute care in coordination with multidisciplinary team members.      Kacie Blackwell RN         [phone]

## 2024-01-10 NOTE — PLAN OF CARE
Patient arrived from PACU with no complaints of pain, sanguineous drainage from KAIDEN drain and chest tube, NPO, has voided since demarco catheter was discontinued, resting with call light in reach.

## 2024-01-10 NOTE — PROGRESS NOTES
[facility]  Cardiothoracic Surgery  Progress Note    Patient Name: Clarence Alberto  MRN: [id number]  Admission Date: 1/9/2024  Hospital Length of Stay: 1 days  Code Status: Full Code   Attending Physician: Forest Orozco MD   Referring Provider: Forest Orozco MD  Principal Problem:Subepithelial esophageal mass      Subjective:     Post-Op Info:  Procedure(s) (LRB):  XI ROBOTIC ESOPHAGEAL MASS RESECTION (Right)  BRONCHOSCOPY (N/A)  EGD (ESOPHAGOGASTRODUODENOSCOPY) (N/A)  BLOCK, NERVE, INTERCOSTAL, 2 OR MORE (Right)   1 Day Post-Op     Interval History: No acute events overnight. Pain well controlled. No air leak. No complaints.      Medications:  Continuous Infusions:  Scheduled Meds:   acetaminophen  650 mg Oral Q6H    albuterol-ipratropium  3 mL Nebulization Q6H WAKE    famotidine (PF)  20 mg Intravenous BID    LIDOcaine (PF) 10 mg/ml (1%)  1 mL Intradermal Once    LIDOcaine  1 patch Transdermal Q24H    methocarbamol (ROBAXIN) IVPB  500 mg Intravenous Q8H     PRN Meds:bisacodyL, fentaNYL, haloperidol lactate, hydrALAZINE, HYDROmorphone, labetalol, metoclopramide, ondansetron, ondansetron, oxyCODONE, oxyCODONE     Objective:     Vital Signs (Most Recent):  Temp: 98.1 °F (36.7 °C) (01/10/24 0806)  Pulse: 61 (01/10/24 0806)  Resp: 18 (01/10/24 0806)  BP: (!) 143/72 (01/10/24 0806)  SpO2: 97 % (01/10/24 0806) Vital Signs (24h Range):  Temp:  [96 °F (35.6 °C)-98.3 °F (36.8 °C)] 98.1 °F (36.7 °C)  Pulse:  [54-77] 61  Resp:  [12-37] 18  SpO2:  [94 %-100 %] 97 %  BP: (136-196)/() 143/72     Weight: 84.8 kg (187 lb)  Body mass index is 25.36 kg/m².    SpO2: 97 %       Intake/Output - Last 3 Shifts         01/08 0700  01/09 0659 01/09 0700  01/10 0659 01/10 0700  01/11 0659    I.V. (mL/kg)  1000 (11.8)     IV Piggyback  2150     Total Intake(mL/kg)  3150 (37.1)     Urine (mL/kg/hr)  775     Drains  52.5     Chest Tube  165     Total Output  992.5     Net  +2157.5                     Lines/Drains/Airways       Drain  Duration                  Chest Tube 01/09/24 1732 Tube - 1 Right Pleural 24 Fr. <1 day         Closed/Suction Drain 01/09/24 1732 Tube - 1 Right Chest Bulb 10 Fr. <1 day              Peripheral Intravenous Line  Duration                  Peripheral IV - Single Lumen 01/09/24 0840 18 G Anterior;Right Forearm 1 day         Peripheral IV - Single Lumen 01/09/24 1417 18 G Left Hand <1 day                     Physical Exam  Vitals reviewed.   Constitutional:       Appearance: Normal appearance.   Cardiovascular:      Rate and Rhythm: Normal rate and regular rhythm.   Pulmonary:      Effort: Pulmonary effort is normal. No respiratory distress.      Right chest tube serosanguinous output, no air leak on suction     Right KAIDEN serosanguinous output holding bulb suction  Abdominal:      General: There is no distension.   Skin:     General: Skin is warm and dry.   Neurological:      General: No focal deficit present.      Mental Status: He is alert and oriented to person, place, and time.   Psychiatric:         Thought Content: Thought content normal.            Significant Labs:  All pertinent labs from the last 24 hours have been reviewed.    Significant Diagnostics:  I have reviewed all pertinent imaging results/findings within the past 24 hours.  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     Esophagram this morning, NPO until further directed by result  Multimodal analgesics  Encouraged ambulation and use of IS  Chest tube to water seal  Potential chest tube removal and DC this afternoon vs tomorrow        Blake Suarez, DO  Cardiothoracic Surgery  [facility]

## 2024-01-10 NOTE — NURSING TRANSFER
Nursing Transfer Note      1/9/2024   8:53 PM    Nurse giving handoff:david rn  Nurse receiving handoff:CSU RN    Reason patient is being transferred: recovery care complete    Transfer To: 311    Transfer via bed    Transfer with cardiac monitoring        Telemetry: Box Number [id number]  Order for Tele Monitor? Yes    Additional Lines: Chest Tube    4eyes on Skin: yes    Medicines sent: n/a    Any special needs or follow-up needed: routine    Patient belongings transferred with patient:  n/a    Chart send with patient: Yes    Notified: spouse    Patient reassessed at: 01/09/24 2030 (date, time)

## 2024-01-11 VITALS
HEART RATE: 60 BPM | BODY MASS INDEX: 25.33 KG/M2 | HEIGHT: 72 IN | OXYGEN SATURATION: 98 % | TEMPERATURE: 98 F | SYSTOLIC BLOOD PRESSURE: 187 MMHG | WEIGHT: 187 LBS | RESPIRATION RATE: 18 BRPM | DIASTOLIC BLOOD PRESSURE: 88 MMHG

## 2024-01-11 DIAGNOSIS — J98.59 MEDIASTINAL MASS: Primary | ICD-10-CM

## 2024-01-11 PROCEDURE — 99238 HOSP IP/OBS DSCHRG MGMT 30/<: CPT | Mod: ,,, | Performed by: STUDENT IN AN ORGANIZED HEALTH CARE EDUCATION/TRAINING PROGRAM

## 2024-01-11 PROCEDURE — 25000003 PHARM REV CODE 250: Performed by: STUDENT IN AN ORGANIZED HEALTH CARE EDUCATION/TRAINING PROGRAM

## 2024-01-11 RX ORDER — ACETAMINOPHEN 500 MG
1000 TABLET ORAL EVERY 8 HOURS
Refills: 0 | COMMUNITY
Start: 2024-01-11

## 2024-01-11 RX ORDER — GABAPENTIN 300 MG/1
300 CAPSULE ORAL NIGHTLY
Qty: 30 CAPSULE | Refills: 11 | Status: SHIPPED | OUTPATIENT
Start: 2024-01-11 | End: 2025-01-10

## 2024-01-11 RX ORDER — OXYCODONE HCL 5 MG/5 ML
5 SOLUTION, ORAL ORAL EVERY 6 HOURS PRN
Status: DISCONTINUED | OUTPATIENT
Start: 2024-01-11 | End: 2024-01-11

## 2024-01-11 RX ORDER — HYDROCODONE BITARTRATE AND ACETAMINOPHEN 7.5; 325 MG/15ML; MG/15ML
15 SOLUTION ORAL EVERY 6 HOURS PRN
Qty: 473 ML | Refills: 0 | Status: SHIPPED | OUTPATIENT
Start: 2024-01-11

## 2024-01-11 RX ORDER — METHOCARBAMOL 500 MG/1
500 TABLET, FILM COATED ORAL 4 TIMES DAILY
Qty: 40 TABLET | Refills: 0 | Status: SHIPPED | OUTPATIENT
Start: 2024-01-11 | End: 2024-01-21

## 2024-01-11 RX ORDER — GABAPENTIN 300 MG/1
300 CAPSULE ORAL NIGHTLY
Status: DISCONTINUED | OUTPATIENT
Start: 2024-01-11 | End: 2024-01-11 | Stop reason: HOSPADM

## 2024-01-11 RX ADMIN — METHOCARBAMOL 500 MG: 500 TABLET ORAL at 09:01

## 2024-01-11 RX ADMIN — ATORVASTATIN CALCIUM 20 MG: 20 TABLET, FILM COATED ORAL at 09:01

## 2024-01-11 RX ADMIN — ACETAMINOPHEN 1000 MG: 500 TABLET ORAL at 06:01

## 2024-01-11 RX ADMIN — FAMOTIDINE 20 MG: 20 TABLET, FILM COATED ORAL at 09:01

## 2024-01-11 RX ADMIN — LISINOPRIL 40 MG: 20 TABLET ORAL at 09:01

## 2024-01-11 NOTE — PROGRESS NOTES
[facility]  Thoracic Surgery  Progress Note    Subjective:     History of Present Illness:  No notes on file    Post-Op Info:  Procedure(s) (LRB):  XI ROBOTIC ESOPHAGEAL MASS RESECTION (Right)  BRONCHOSCOPY (N/A)  EGD (ESOPHAGOGASTRODUODENOSCOPY) (N/A)  BLOCK, NERVE, INTERCOSTAL, 2 OR MORE (Right)   2 Days Post-Op     Interval History:  ml output. KAIDEN drain 20 ml output. No air leak. Pain controlled.     Medications:  Continuous Infusions:  Scheduled Meds:   acetaminophen  1,000 mg Oral Q8H    atorvastatin  20 mg Oral Daily    famotidine  20 mg Oral BID    LIDOcaine (PF) 10 mg/ml (1%)  1 mL Intradermal Once    LIDOcaine  1 patch Transdermal Q24H    lisinopriL  40 mg Oral Daily    methocarbamoL  500 mg Oral QID     PRN Meds:albuterol-ipratropium, bisacodyL, hydrALAZINE, metoclopramide, ondansetron, ondansetron, oxyCODONE, oxyCODONE     Review of patient's allergies indicates:   Allergen Reactions    Hydralazine analogues Rash     Face gets red     Objective:     Vital Signs (Most Recent):  Temp: 98.4 °F (36.9 °C) (01/11/24 0522)  Pulse: 64 (01/11/24 0522)  Resp: 18 (01/11/24 0522)  BP: (!) 141/75 (01/11/24 0522)  SpO2: 96 % (01/11/24 0522) Vital Signs (24h Range):  Temp:  [97.4 °F (36.3 °C)-98.4 °F (36.9 °C)] 98.4 °F (36.9 °C)  Pulse:  [57-74] 64  Resp:  [16-18] 18  SpO2:  [95 %-98 %] 96 %  BP: (136-199)/(65-91) 141/75     Intake/Output - Last 3 Shifts         01/09 0700  01/10 0659 01/10 0700  01/11 0659 01/11 0700  01/12 0659    P.O.  1656     I.V. (mL/kg) 1000 (11.8)      IV Piggyback 2150      Total Intake(mL/kg) 3150 (37.1) 1656 (19.5)     Urine (mL/kg/hr) 775 550 (0.3)     Drains 52.5 20     Chest Tube 165 170     Total Output 992.5 740     Net +2157.5 +916            Urine Occurrence  6 x             SpO2: 96 %        Physical Exam  Vitals reviewed.   Constitutional:       Appearance: Normal appearance.   Cardiovascular:      Rate and Rhythm: Normal rate and regular rhythm.    Pulmonary:      Effort: Pulmonary effort is normal. No respiratory distress.      Comments: Chest tube and KAIDEN drain on right side.   Abdominal:      General: There is no distension.   Skin:     General: Skin is warm and dry.   Neurological:      General: No focal deficit present.      Mental Status: He is alert and oriented to person, place, and time.   Psychiatric:         Thought Content: Thought content normal.            Significant Labs:  All pertinent labs from the last 24 hours have been reviewed.    Significant Diagnostics:  CXR: I have reviewed all pertinent results/findings within the past 24 hours    VTE Risk Mitigation (From admission, onward)           Ordered     IP VTE LOW RISK PATIENT  Once         01/09/24 0839     Place SOULEYMANE hose  Until discontinued         01/09/24 0839     Place sequential compression device  Until discontinued         01/09/24 0839                  Assessment/Plan:     * Subepithelial esophageal mass  POD2 s/p robotic esophageal mass resection    - Pending AM CXR, removed KAIDEN and Chest tube. Following removal, discharge home on clear liquid diet.   - Clear liquid diet  - multimodal pain regimen  - Daily CXR  - OOB, ambulate as tolerated   - DVT ppx        Deuce Montero PA-C  Thoracic Surgery  [facility]

## 2024-01-11 NOTE — HOSPITAL COURSE
Admitted 01/09/23 following robotic esophageal mass resection. POD1 esophogram without contrast extravasation on review. Diet advanced to clear liquids. POD2 chest tube output 170 ml and KAIDEN output 20 ml. No air leak. Discontinued chest tubes and discharged home in good condition.    

## 2024-01-11 NOTE — DISCHARGE INSTRUCTIONS
No driving until all narcotic medications have been taken. Return to ER with any fever/chills, difficulty breathing . Activity as tolerated. Advance diet as you and physician discussed.

## 2024-01-11 NOTE — SUBJECTIVE & OBJECTIVE
Interval History:  ml output. KAIDEN drain 20 ml output. No air leak. Pain controlled.     Medications:  Continuous Infusions:  Scheduled Meds:   acetaminophen  1,000 mg Oral Q8H    atorvastatin  20 mg Oral Daily    famotidine  20 mg Oral BID    LIDOcaine (PF) 10 mg/ml (1%)  1 mL Intradermal Once    LIDOcaine  1 patch Transdermal Q24H    lisinopriL  40 mg Oral Daily    methocarbamoL  500 mg Oral QID     PRN Meds:albuterol-ipratropium, bisacodyL, hydrALAZINE, metoclopramide, ondansetron, ondansetron, oxyCODONE, oxyCODONE     Review of patient's allergies indicates:   Allergen Reactions    Hydralazine analogues Rash     Face gets red     Objective:     Vital Signs (Most Recent):  Temp: 98.4 °F (36.9 °C) (01/11/24 0522)  Pulse: 64 (01/11/24 0522)  Resp: 18 (01/11/24 0522)  BP: (!) 141/75 (01/11/24 0522)  SpO2: 96 % (01/11/24 0522) Vital Signs (24h Range):  Temp:  [97.4 °F (36.3 °C)-98.4 °F (36.9 °C)] 98.4 °F (36.9 °C)  Pulse:  [57-74] 64  Resp:  [16-18] 18  SpO2:  [95 %-98 %] 96 %  BP: (136-199)/(65-91) 141/75     Intake/Output - Last 3 Shifts         01/09 0700  01/10 0659 01/10 0700 01/11 0659 01/11 0700 01/12 0659    P.O.  1656     I.V. (mL/kg) 1000 (11.8)      IV Piggyback 2150      Total Intake(mL/kg) 3150 (37.1) 1656 (19.5)     Urine (mL/kg/hr) 775 550 (0.3)     Drains 52.5 20     Chest Tube 165 170     Total Output 992.5 740     Net +2157.5 +916            Urine Occurrence  6 x             SpO2: 96 %        Physical Exam  Vitals reviewed.   Constitutional:       Appearance: Normal appearance.   Cardiovascular:      Rate and Rhythm: Normal rate and regular rhythm.   Pulmonary:      Effort: Pulmonary effort is normal. No respiratory distress.      Comments: Chest tube and KAIDEN drain on right side.   Abdominal:      General: There is no distension.   Skin:     General: Skin is warm and dry.   Neurological:      General: No focal deficit present.      Mental Status: He is alert and oriented to person, place, and  time.   Psychiatric:         Thought Content: Thought content normal.            Significant Labs:  All pertinent labs from the last 24 hours have been reviewed.    Significant Diagnostics:  CXR: I have reviewed all pertinent results/findings within the past 24 hours    VTE Risk Mitigation (From admission, onward)           Ordered     IP VTE LOW RISK PATIENT  Once         01/09/24 0839     Place SOULEYMANE hose  Until discontinued         01/09/24 0839     Place sequential compression device  Until discontinued         01/09/24 0839

## 2024-01-11 NOTE — PLAN OF CARE
Problem: Adult Inpatient Plan of Care  Goal: Patient-Specific Goal (Individualized)  Outcome: Ongoing, Progressing  Flowsheets (Taken 1/10/2024 1857)  Anxieties, Fears or Concerns: discharging tomorrow  Individualized Care Needs: CT and KAIDEN drain     Problem: Infection  Goal: Absence of Infection Signs and Symptoms  Outcome: Ongoing, Progressing  Intervention: Prevent or Manage Infection  Flowsheets (Taken 1/10/2024 1857)  Infection Management: aseptic technique maintained     Problem: Fall Injury Risk  Goal: Absence of Fall and Fall-Related Injury  Outcome: Ongoing, Progressing  Intervention: Promote Injury-Free Environment  Flowsheets (Taken 1/10/2024 1857)  Safety Promotion/Fall Prevention:   assistive device/personal item within reach   side rails raised x 2   Fall Risk reviewed with patient/family   Fall Risk signage in place   nonskid shoes/socks when out of bed     Problem: Skin Injury Risk Increased  Goal: Skin Health and Integrity  Outcome: Ongoing, Progressing  Intervention: Optimize Skin Protection  Flowsheets (Taken 1/10/2024 1857)  Pressure Reduction Techniques: weight shift assistance provided       Plan of care discussed with patient. Patient is free of fall/trauma/injury. Chest tube and KAIDEN drain remain in place, no complications. Drainage sanguinous. Promoting PO liquid intake. All questions addressed.

## 2024-01-11 NOTE — PLAN OF CARE
Patient in bed with call light in reach; had a flushed red face and systolic of 199 for which I received an order for prn hydralazine. After administering the hydralazine I noticed the patient's face was more red, no rash anywhere else on the patient's body and the blood pressure responded well to the hydralazine and went down. I added hydralazine as a low level allergy on the patient's chart. Otherwise, complaints of pain were at 2-3/10 and well controlled with scheduled tylenol and methocarbamol. Chest tube and KAIDEN drain draining serosanguineous fluid.

## 2024-01-11 NOTE — PLAN OF CARE
CHW met with patient/family at bedside. Patient experience rounding completed and reviewed the following.     Do you know your discharge plan? Yes or No,    If yes, what is the plan? (Home, Home Health, Rehab, SNF, LTAC, or Other)  Yes Home    Have you discussed your needs and preferences with your SW/CM? Yes or No  Yes Home    If you are discharging home, do you have help at home? Yes or No Yes     Do you think you will need help additional at home at discharge? Yes or No  No    Do you currently have difficulty keeping up with bills, affording medicine or buying food? Yes or No No    Assigned SW/CM notified of any patient/family needs or concerns. Appropriate resources provided to address patient's needs.            Jennifer Serrano CHW  Case Management  [phone]

## 2024-01-11 NOTE — PLAN OF CARE
01/11/24 0924   Final Note   Assessment Type Final Discharge Note   Anticipated Discharge Disposition Home   What phone number can be called within the next 1-3 days to see how you are doing after discharge? [phone]   Hospital Resources/Appts/Education Provided Provided patient/caregiver with written discharge plan information;Provided education on problems/symptoms using teachback   Post-Acute Status   Discharge Delays None known at this time     Patient to discharge home /self care. No needs from Case management.  Per physician discharge summary: Discharged Condition: good   Disposition: Home or Self Care     Kacie Blackwell RN    [phone]    Future Appointments   Date Time Provider Department Center   1/25/2024  9:00 AM [facility]   1/25/2024 10:15 AM Forest Orozco MD [facility]

## 2024-01-11 NOTE — ASSESSMENT & PLAN NOTE
POD2 s/p robotic esophageal mass resection    - Pending AM CXR, removed KAIDEN and Chest tube. Following removal, discharge home on clear liquid diet.   - Clear liquid diet  - multimodal pain regimen  - Daily CXR  - OOB, ambulate as tolerated   - DVT ppx

## 2024-01-11 NOTE — PLAN OF CARE
Problem: Adult Inpatient Plan of Care  Goal: Plan of Care Review  Outcome: Adequate for Care Transition  Goal: Patient-Specific Goal (Individualized)  Outcome: Adequate for Care Transition  Goal: Absence of Hospital-Acquired Illness or Injury  Outcome: Adequate for Care Transition  Goal: Optimal Comfort and Wellbeing  Outcome: Adequate for Care Transition  Goal: Readiness for Transition of Care  Outcome: Adequate for Care Transition     Problem: Infection  Goal: Absence of Infection Signs and Symptoms  Outcome: Adequate for Care Transition     Problem: Fall Injury Risk  Goal: Absence of Fall and Fall-Related Injury  Outcome: Adequate for Care Transition     Problem: Skin Injury Risk Increased  Goal: Skin Health and Integrity  Outcome: Adequate for Care Transition

## 2024-01-11 NOTE — DISCHARGE SUMMARY
[facility] - Cardiology Stepdown  Thoracic Surgery  Discharge Summary    Patient Name: Clarence Alberto  MRN: [id number]  Admission Date: 1/9/2024  Hospital Length of Stay: 2 days  Discharge Date and Time:  01/11/2024 12:37 PM  Attending Physician: Forest Orozco MD   Discharging Provider: Deuce Montero PA-C  Primary Care Provider: Emilia, Primary Doctor    HPI:   No notes on file    Procedure(s) (LRB):  XI ROBOTIC ESOPHAGEAL MASS RESECTION (Right)  BRONCHOSCOPY (N/A)  EGD (ESOPHAGOGASTRODUODENOSCOPY) (N/A)  BLOCK, NERVE, INTERCOSTAL, 2 OR MORE (Right)      Hospital Course: Admitted 01/09/23 following robotic esophageal mass resection. POD1 esophogram without contrast extravasation on review. Diet advanced to clear liquids. POD2 chest tube output 170 ml and KAIDEN output 20 ml. No air leak. Discontinued chest tubes and discharged home in good condition.     Goals of Care Treatment Preferences:  Code Status: Full Code          Significant Diagnostic Studies: Radiology: X-Ray: CXR: X-Ray Chest 1 View (CXR):   Results for orders placed or performed during the hospital encounter of 01/09/24   X-Ray Chest AP Single View    Narrative    EXAMINATION:  XR CHEST 1 VIEW    CLINICAL HISTORY:  Post-Op;    TECHNIQUE:  Single frontal view of the chest was performed.    COMPARISON:  Non 01/09/2024 e    FINDINGS:  Heart size normal.  The right hemidiaphragm is elevated.  Small subsegmental atelectatic changes noted at the right lung base.  No significant airspace consolidation or pleural effusion identified.      Impression    See above      Electronically signed by: Pierre Whalen MD  Date:    01/10/2024  Time:    08:55       Pending Diagnostic Studies:       Procedure Component Value Units Date/Time    Specimen to Pathology, Surgery Pulmonary and Thoracic [[id number]] Collected: 01/09/24 5194    Order Status: Sent Lab Status: In process Updated: 01/10/24 1138    Specimen: Tissue           Final Active Diagnoses:    Diagnosis Date Noted  POA    PRINCIPAL PROBLEM:  Subepithelial esophageal mass [K22.89] 01/10/2024 Yes      Problems Resolved During this Admission:      Discharged Condition: good    Disposition: Home or Self Care    Follow Up:    Patient Instructions:      Diet clear liquid   Order Comments: CLD until Monday 01/15/24 => FLD 01/15/23 - 01/18/23 => Soft diet 01/19/23 until follow up appointment     No driving until:   Order Comments: No driving until all narcotic medications have been discontinued.     Notify your health care provider if you experience any of the following:  temperature >100.4     Notify your health care provider if you experience any of the following:  persistent nausea and vomiting or diarrhea     Notify your health care provider if you experience any of the following:  severe uncontrolled pain     Notify your health care provider if you experience any of the following:  redness, tenderness, or signs of infection (pain, swelling, redness, odor or green/yellow discharge around incision site)     Notify your health care provider if you experience any of the following:  difficulty breathing or increased cough     Notify your health care provider if you experience any of the following:  severe persistent headache     Notify your health care provider if you experience any of the following:  worsening rash     Notify your health care provider if you experience any of the following:  persistent dizziness, light-headedness, or visual disturbances     Notify your health care provider if you experience any of the following:  increased confusion or weakness     Remove dressing in 48 hours     Activity as tolerated     Medications:  Reconciled Home Medications:      Medication List        START taking these medications      acetaminophen 500 MG tablet  Commonly known as: TYLENOL  Take 2 tablets (1,000 mg total) by mouth every 8 (eight) hours.     gabapentin 300 MG capsule  Commonly known as: NEURONTIN  Take 1 capsule (300 mg  total) by mouth every evening.     hydrocodone-apap 7.5-325 MG/15 ML oral solution  Commonly known as: HYCET  Take 15 mLs by mouth every 6 (six) hours as needed for Pain.     methocarbamoL 500 MG Tab  Commonly known as: ROBAXIN  Take 1 tablet (500 mg total) by mouth 4 (four) times daily. for 10 days            CONTINUE taking these medications      atorvastatin 20 MG tablet  Commonly known as: LIPITOR  = 1 tab, Oral, Daily, # 90 tab, 0 Refill(s), Maintenance, Pharmacy: [id number], 183, cm, 09/12/23 12:39:00 CDT, Height/Length Measured, 86.2, kg, 09/12/23 12:47:00 CDT, Weight Dosing     lisinopriL 40 MG tablet  Commonly known as: PRINIVIL,ZESTRIL  Take 40 mg by mouth once daily.     PROSTATE OPTIMIZER 6.6 mg-100 mg- 200 mg-5 mcg Cap  Generic drug: om-3-dha-epa-fish-D3-saw-herbs  Take 1 capsule by mouth Daily.     SYMBICORT 160-4.5 mcg/actuation Hfaa  Generic drug: budesonide-formoterol 160-4.5 mcg  Inhale 2 puffs into the lungs 2 (two) times daily.              Deuce Montero PA-C  Thoracic Surgery  [facility] - Cardiology Stepdown

## 2024-01-11 NOTE — ASSESSMENT & PLAN NOTE
POD2 s/p robotic esophageal mass resection    - Pending AM CXR, removed KAIDEN and Chest tube. Following removal, discharge home on clear liquid diet.   - Clear liquid diet until 01/15/23 => full liquid diet 01/15/23 - 01/18/23 => soft diet 01/19/23 - follow up appt  - Crush all medications except gabapentin (open capsule and mix with juice/apple sauce)  - multimodal pain regimen  - Daily CXR  - OOB, ambulate as tolerated   - DVT ppx

## 2024-01-11 NOTE — PROGRESS NOTES
01/10/24 1603   Vital Signs   BP (!) 170/90   MAP (mmHg) 124   BP Location Right arm   Patient Position Lying     Notified Dr Suarez, one time IV hydralazine dose ordered, and BP PO med restarted for tomorrow AM.

## 2024-01-13 LAB
BLD PROD TYP BPU: NORMAL
BLD PROD TYP BPU: NORMAL
BLOOD UNIT EXPIRATION DATE: NORMAL
BLOOD UNIT EXPIRATION DATE: NORMAL
BLOOD UNIT TYPE CODE: 5100
BLOOD UNIT TYPE CODE: 5100
BLOOD UNIT TYPE: NORMAL
BLOOD UNIT TYPE: NORMAL
CODING SYSTEM: NORMAL
CODING SYSTEM: NORMAL
CROSSMATCH INTERPRETATION: NORMAL
CROSSMATCH INTERPRETATION: NORMAL
DISPENSE STATUS: NORMAL
DISPENSE STATUS: NORMAL
NUM UNITS TRANS PACKED RBC: NORMAL
NUM UNITS TRANS PACKED RBC: NORMAL

## 2024-01-19 NOTE — PHYSICIAN QUERY
PT Name: Clarence Alberto  MR #: [id number]    DOCUMENTATION CLARIFICATION     CDS/: Kenny Pollard Jr RN CCDS              Contact information:[email or web address]   This form is a permanent document in the medical record.     Query Date: January 19, 2024    By submitting this query, we are merely seeking further clarification of documentation.  Please utilize your independent clinical judgment when addressing the question(s) below.    The medical record contains the following:  Pathology Findings Location in Medical Record   Final Pathologic Diagnosis Esophageal mass, excision:  Gastrointestinal stromal tumor (GIST), spindle cell type.      Please see attached synoptic report for additional information.            CANCER CASE SUMMARY SYNOPTIC REPORT (GASTROINTESTINAL STROMAL TUMOR, RESECTION)    CLINICAL  Pre resection treatment:  No known pre resection therapy    SPECIMEN  Procedure: Local excision    TUMOR  Tumor focality:  Unifocal  Multiple primary sites:  Not applicable  Tumor site: Esophagus  Histologic type:  Gastrointestinal stromal tumor, spindle cell type  Tumor size:  6.5 cm (gross measurement)  Mitotic rate:  1 mitoses per 5 mm2  Histologic grade:  Low-grade  Necrosis: Not identified  Treatment effect:  No known pre-surgical therapy  Risk assessment:  Low    MARGINS  Margin status:  Tumor present at inked margin  Margin involved by GIST:  Unoriented specimen    REGIONAL LYMPH NODES  Not applicable (no regional lymph nodes submitted or found)    DISTANT METASTASIS  Not applicable    pTNM CLASSIFICATION (AJCC 8th Edition)  Modified classification:  N/A  pT category:  pT3:  Tumor more than 5 cm but not more than 10 cm  pN category:  Not assigned  pM category: Not applicable    ADDITIONAL FINDINGS  None    SPECIAL STUDIES  Immunohistochemical stains are performed with appropriate controls.  The tumor cells are positive for  and CD34, while negative for desmin, SMA, and S100.  The  findings support the above diagnosis of gastrointestinal stromal tumor.    Ample tumor present in all blocks (for subsequent ancillary studies, if needed    1/9 Surgical Pathology Report       Please clarify the pathology findings.  [ x ] Pathology findings noted above are ruled in/confirmed as diagnoses   [  ] Pathology findings noted above are not confirmed as diagnoses   [  ] Other diagnosis (please specify): ___________              [  ] Clinically Undetermined     Please document in your progress notes daily for the duration of treatment until resolved and include in your discharge summary.    Form No. [id number]

## 2024-01-22 NOTE — PROGRESS NOTES
Subjective     Patient ID: Clarence Alberto is a 68 y.o. male.    Chief Complaint: Post-op Evaluation    Diagnosis:  GIST     Pre-operative therapy: EUS - An intramural (subepithelial) lesion was found in the middle third of the esophagus. It appeared to originate from within the muscularis propria                      (Layer 4). Tissue has not been obtained. However, the endosonographic appearance is suggestive of a stromal cell (smooth muscle) neoplasm, of indeterminate biological behavior.     Procedure(s) and date(s): 1/9/24: robotic resection of esophageal mass     Pathology: Gastrointestinal stromal tumor (GIST), spindle cell type. 6.5 cm (gross measurement) Mitotic rate: 1 mitoses per 5 mm2 Histologic grade: Low-grad pT3NX, tumor present at inked margin     Post-operative therapy: discuss in UGI     HPI  68 y.o. male with HTN, HLD, and chronic cough (multiple URIs) here today for 2 week follow-up from robotic resection of paraesophageal mass. Uncomplicated post-operative course. Negative esophagram post-operatively. Pain has been controlled. He has progressed diet to regular. No dysphagia. Normal bowel movements. Nightly gabapentin. Occasional tylenol.     Review of Systems   Constitutional:  Negative for activity change, fatigue and fever.   Respiratory:  Negative for cough and shortness of breath.    Cardiovascular:  Negative for chest pain.   Gastrointestinal:  Negative for abdominal pain, constipation and nausea.   Musculoskeletal:  Negative for arthralgias.   Neurological:  Negative for coordination difficulties.   Psychiatric/Behavioral:  Negative for agitation and self-injury.           Objective     Vitals:    01/25/24 0932   BP: (!) 159/99   Pulse: 62   SpO2: 95%   Weight: 84.1 kg (185 lb 6.5 oz)   Height: 6' (1.829 m)   PainSc: 0-No pain       Physical Exam  Vitals reviewed.   Constitutional:       Appearance: Normal appearance.   HENT:      Head: Normocephalic and atraumatic.   Eyes:       Extraocular Movements: Extraocular movements intact.   Cardiovascular:      Rate and Rhythm: Normal rate.   Pulmonary:      Effort: Pulmonary effort is normal.      Comments: Right robotic incisions healing, scabbing at incisions. No erythema.   Chest tube site with suture in place. Reactive erythema.   Abdominal:      Palpations: Abdomen is soft.   Musculoskeletal:         General: Normal range of motion.      Cervical back: Normal range of motion and neck supple.   Skin:     General: Skin is warm and dry.   Neurological:      General: No focal deficit present.      Mental Status: He is alert and oriented to person, place, and time.   Psychiatric:         Mood and Affect: Mood normal.         Behavior: Behavior normal.     CXR 1/25/24: no PTX, no pleural effusion      Assessment and Plan     68 y.o. male with HTN, HLD, and chronic cough (multiple URIs) here today for 2 week follow-up from robotic resection of paraesophageal mass.  Pathology consistent with 5.5 cm GIST - reported unoriented positive margin, not clinically accurate/relevant. Low grade tumor      Plan to discuss in tumor board next week for surveillance recommendations. Will call patient with recommendations.   Continue to advance diet as tolerated.

## 2024-01-25 ENCOUNTER — HOSPITAL ENCOUNTER (OUTPATIENT)
Dept: RADIOLOGY | Facility: HOSPITAL | Age: 69
Discharge: HOME | End: 2024-01-25
Payer: MEDICARE

## 2024-01-25 ENCOUNTER — OFFICE VISIT (OUTPATIENT)
Dept: CARDIOTHORACIC SURGERY | Facility: CLINIC | Age: 69
End: 2024-01-25
Payer: MEDICARE

## 2024-01-25 VITALS
WEIGHT: 185.44 LBS | OXYGEN SATURATION: 95 % | HEART RATE: 62 BPM | DIASTOLIC BLOOD PRESSURE: 99 MMHG | HEIGHT: 72 IN | BODY MASS INDEX: 25.12 KG/M2 | SYSTOLIC BLOOD PRESSURE: 159 MMHG

## 2024-01-25 DIAGNOSIS — C49.A1 GASTROINTESTINAL STROMAL TUMOR (GIST) OF ESOPHAGUS: Primary | ICD-10-CM

## 2024-01-25 DIAGNOSIS — J98.59 MEDIASTINAL MASS: ICD-10-CM

## 2024-01-25 PROCEDURE — 99999 PR PBB SHADOW E&M-EST. PATIENT-LVL III: CPT | Mod: PBBFAC,,, | Performed by: STUDENT IN AN ORGANIZED HEALTH CARE EDUCATION/TRAINING PROGRAM

## 2024-01-25 PROCEDURE — 71046 X-RAY EXAM CHEST 2 VIEWS: CPT | Mod: TC

## 2024-01-25 PROCEDURE — 71046 X-RAY EXAM CHEST 2 VIEWS: CPT | Mod: 26,,, | Performed by: STUDENT IN AN ORGANIZED HEALTH CARE EDUCATION/TRAINING PROGRAM

## 2024-01-25 PROCEDURE — 99024 POSTOP FOLLOW-UP VISIT: CPT | Mod: POP,,, | Performed by: STUDENT IN AN ORGANIZED HEALTH CARE EDUCATION/TRAINING PROGRAM

## 2024-01-25 PROCEDURE — 99213 OFFICE O/P EST LOW 20 MIN: CPT | Mod: PBBFAC,25 | Performed by: STUDENT IN AN ORGANIZED HEALTH CARE EDUCATION/TRAINING PROGRAM

## 2024-01-26 NOTE — PROGRESS NOTES
[facility] UGI MULTIDISCIPLINARY TUMOR BOARD  PATIENT REVIEW FORM   ____________________________________________________________    CLINIC #: [id number]  DATE: 1/29/2024    DIAGNOSIS: esophageal GIST    PRESENTER: Pam    PATIENT SUMMARY:   This 69 y/o gentleman presented with chronic cough and multiple URIs, which prompted CT chest. Reviewed imaging from 11/2023, noting 5.3 x 4.5 cm mediastinal mass. He underwent EUS per Dr. Turpin in 12/2023, who identified round hypoechoic mass at 29cm extending to 35cm in the middle third of esophagus. He underwent robotic esophageal mass resection per Dr. Orozco on 1/9/24.   Today's presentation is for pathology review.  6.5 cm esophageal GIST, low grade  Mitotic rate of 1  + margin   T3     BOARD RECOMMENDATIONS:   Send pathology for mutations  Refer to med onc to discuss adj tx      CONSULT NEEDED:     [] Surgery    [x] Hem/Onc    [] Rad/Onc    [] Dietary                 [] Social Service    [] Psychology       [] AES  [] Radiology     Pathologic Stage: Tumor 3 Node(s) 0  Metastasis  0      GROUP STAGE:  [] O    [] 1A    [x] IB    [] IIA    [] IIB     [] IIIA     [] IIIB     [] IIIC    []IV  [] Local recurrence     [] Regional recurrence     [] Distant recurrence   Metastatic site(s): none         [x] Pretty'l Treatment Guidelines reviewed and care planned is consistent with guidelines.         (i.e., NCCN, NCI, PD, ACO, AUA, etc.)    PRESENTATION AT CANCER CONFERENCE:         [] Prospective    [x] Retrospective     [] Follow-Up            [] Eligible for clinical trial

## 2024-01-27 PROBLEM — C49.A1: Status: ACTIVE | Noted: 2024-01-27

## 2024-01-29 ENCOUNTER — TUMOR BOARD CONFERENCE (OUTPATIENT)
Dept: SURGERY | Facility: CLINIC | Age: 69
End: 2024-01-29
Payer: MEDICARE

## 2024-01-29 ENCOUNTER — TELEPHONE (OUTPATIENT)
Dept: HEMATOLOGY/ONCOLOGY | Facility: CLINIC | Age: 69
End: 2024-01-29
Payer: MEDICARE

## 2024-01-29 DIAGNOSIS — C49.A1 GASTROINTESTINAL STROMAL TUMOR (GIST) OF ESOPHAGUS: Primary | ICD-10-CM

## 2024-01-29 PROBLEM — Z01.818 PRE-OP EVALUATION: Status: RESOLVED | Noted: 2023-12-07 | Resolved: 2024-01-29

## 2024-02-15 ENCOUNTER — OFFICE VISIT (OUTPATIENT)
Dept: HEMATOLOGY/ONCOLOGY | Facility: CLINIC | Age: 69
End: 2024-02-15
Payer: MEDICARE

## 2024-02-15 VITALS
WEIGHT: 183.44 LBS | SYSTOLIC BLOOD PRESSURE: 163 MMHG | HEIGHT: 72 IN | HEART RATE: 68 BPM | OXYGEN SATURATION: 99 % | DIASTOLIC BLOOD PRESSURE: 93 MMHG | TEMPERATURE: 98 F | BODY MASS INDEX: 24.85 KG/M2

## 2024-02-15 DIAGNOSIS — E78.5 HYPERLIPIDEMIA, UNSPECIFIED HYPERLIPIDEMIA TYPE: ICD-10-CM

## 2024-02-15 DIAGNOSIS — I10 PRIMARY HYPERTENSION: ICD-10-CM

## 2024-02-15 DIAGNOSIS — C49.A1 GASTROINTESTINAL STROMAL TUMOR (GIST) OF ESOPHAGUS: Primary | ICD-10-CM

## 2024-02-15 LAB
FINAL PATHOLOGIC DIAGNOSIS: NORMAL
GROSS: NORMAL
Lab: NORMAL
SUPPLEMENTAL DIAGNOSIS: NORMAL

## 2024-02-15 PROCEDURE — 99213 OFFICE O/P EST LOW 20 MIN: CPT | Mod: PBBFAC | Performed by: INTERNAL MEDICINE

## 2024-02-15 PROCEDURE — 99999 PR PBB SHADOW E&M-EST. PATIENT-LVL III: CPT | Mod: PBBFAC,,, | Performed by: INTERNAL MEDICINE

## 2024-02-15 PROCEDURE — 99204 OFFICE O/P NEW MOD 45 MIN: CPT | Mod: S$PBB,,, | Performed by: INTERNAL MEDICINE

## 2024-02-15 RX ORDER — HYDROCHLOROTHIAZIDE 12.5 MG/1
12.5 TABLET ORAL
COMMUNITY

## 2024-02-15 NOTE — PROGRESS NOTES
Oncology Clinic   Initial Consult Note    Patient: Clarence Alberto  MRN: [id number]  Date: 2/15/2024    Chief Complaint: GIST    Oncologic History:   This 67 y/o gentleman presented with chronic cough and multiple URIs, which prompted CT chest. Reviewed imaging from 11/2023, noting 5.3 x 4.5 cm mediastinal mass. He underwent EUS per Dr. Turpin in 12/2023, who identified round hypoechoic mass at 29cm extending to 35cm in the middle third of esophagus. He underwent robotic esophageal mass resection per Dr. Orozco on 1/9/24.   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 Alberto is a 68 y.o. male who is being seen for an initial consult. Recovering well from surgery. Denies any dysphagia, no chest pain, no heart burn. Oral intake back to normal. No weight loss. Here to discuss pathology report and role of adjuvant therapy.      Past Medical History:   Past Medical History:   Diagnosis Date    HTN (hypertension)     Mild intermittent asthma, uncomplicated        Past Surgical HIstory:   Past Surgical History:   Procedure Laterality Date    BRONCHOSCOPY N/A 1/9/2024    Procedure: BRONCHOSCOPY;  Surgeon: Forest Orozco MD;  Location: [facility] OR [address] [place] [address];  Service: Cardiothoracic;  Laterality: N/A;    ENDOSCOPIC ULTRASOUND OF UPPER GASTROINTESTINAL TRACT N/A 12/29/2023    Procedure: ULTRASOUND, UPPER GI TRACT, ENDOSCOPIC;  Surgeon: Pierre Turpin MD;  Location: [address]);  Service: Endoscopy;  Laterality: N/A;  12/27/23: instructions sent via portal- GD    ESOPHAGOGASTRODUODENOSCOPY N/A 12/29/2023    Procedure: EGD (ESOPHAGOGASTRODUODENOSCOPY);  Surgeon: Pierre Turpin MD;  Location: [address]);  Service: Endoscopy;  Laterality: N/A;    ESOPHAGOGASTRODUODENOSCOPY N/A 1/9/2024    Procedure: EGD (ESOPHAGOGASTRODUODENOSCOPY);  Surgeon: Forest Orozco MD;  Location: [facility] OR [facility];  Service: Cardiothoracic;  Laterality: N/A;    INJECTION OF ANESTHETIC AGENT AROUND MULTIPLE INTERCOSTAL NERVES Right 1/9/2024    Procedure: BLOCK, NERVE, INTERCOSTAL, 2 OR MORE;  Surgeon: Forest Orozco MD;  [facility];  Service: Cardiothoracic;  Laterality: Right;    XI ROBOTIC RATS Right 1/9/2024    Procedure: XI ROBOTIC ESOPHAGEAL MASS RESECTION;  Surgeon: Forest Orozco MD;  [facility];  Service: Cardiothoracic;  Laterality: Right;       Family History:   Family History   Problem Relation Age of Onset    Lung cancer Maternal Uncle 38       Social History:  reports that he has never smoked. He has never used smokeless tobacco. He reports current alcohol use of about 7.0 standard drinks of alcohol per week. He reports that he does not use drugs.    Medications:  Current Outpatient Medications   Medication Sig Dispense Refill    atorvastatin (LIPITOR) 20 MG tablet = 1 tab, Oral, Daily, # 90 tab, 0 Refill(s), Maintenance, Pharmacy: [facility] ST[id number], 183, cm, 09/12/23 12:39:00 CDT, Height/Length Measured, 86.2, kg, 09/12/23 12:47:00 CDT, Weight Dosing      hydroCHLOROthiazide (HYDRODIURIL) 12.5 MG Tab Take 12.5 mg by mouth.      lisinopriL (PRINIVIL,ZESTRIL) 40 MG tablet Take 40 mg by mouth once daily.      om-3-dha-epa-fish-D3-saw-herbs (PROSTATE OPTIMIZER) 6.6 mg-100 mg- 200 mg-5 mcg Cap Take 1 capsule by mouth Daily.      SYMBICORT 160-4.5 mcg/actuation HFAA Inhale 2 puffs into the lungs 2 (two) times daily.      acetaminophen (TYLENOL) 500 MG tablet Take 2 tablets (1,000 mg total) by mouth every 8 (eight) hours. (Patient not taking: Reported on 2/15/2024)  0    gabapentin (NEURONTIN) 300 MG capsule Take 1 capsule (300 mg total) by mouth every evening. (Patient not taking: Reported on 2/15/2024) 30 capsule 11    hydrocodone-acetaminophen (HYCET) solution 7.5-325 mg/15mL Take 15 mLs by mouth every 6 (six) hours as needed for Pain. (Patient not taking: Reported on  "2/15/2024) 473 mL 0     No current facility-administered medications for this visit.       Review of Systems   Constitutional:  Negative for chills, fatigue and fever.   HENT:  Negative for rhinorrhea and sore throat.    Respiratory:  Negative for cough, shortness of breath and wheezing.    Cardiovascular:  Negative for chest pain, palpitations and leg swelling.   Gastrointestinal:  Negative for diarrhea, nausea and vomiting.   Genitourinary:  Negative for dysuria and flank pain.   Musculoskeletal:  Negative for back pain.   Skin:  Negative for rash and wound.   Neurological:  Negative for weakness and numbness.   Psychiatric/Behavioral:  Negative for agitation, behavioral problems and confusion.          Objective:     Vitals:    02/15/24 1343   BP: (!) 163/93   BP Location: Left arm   Patient Position: Sitting   BP Method: Medium (Automatic)   Pulse: 68   Temp: 98.4 °F (36.9 °C)   TempSrc: Oral   SpO2: 99%   Weight: 83.2 kg (183 lb 6.8 oz)   Height: 5' 11.5" (1.816 m)       BMI: Body mass index is 25.23 kg/m².     Physical Exam  Constitutional:       General: He is not in acute distress.     Appearance: Normal appearance. He is not toxic-appearing.   HENT:      Head: Normocephalic and atraumatic.      Nose: Nose normal.      Mouth/Throat:      Mouth: Mucous membranes are moist.      Pharynx: Oropharynx is clear.   Eyes:      General: No scleral icterus.     Conjunctiva/sclera: Conjunctivae normal.   Cardiovascular:      Rate and Rhythm: Normal rate.   Pulmonary:      Effort: Pulmonary effort is normal. No respiratory distress.   Abdominal:      Palpations: Abdomen is soft.   Musculoskeletal:      Cervical back: Normal range of motion.      Right lower leg: No edema.      Left lower leg: No edema.   Skin:     General: Skin is warm and dry.      Findings: No rash.   Neurological:      Mental Status: He is alert and oriented to person, place, and time.      Gait: Gait normal.   Psychiatric:         Mood and Affect: " Mood normal.         Behavior: Behavior normal.         Thought Content: Thought content normal.         Laboratory Data:  No visits with results within 1 Week(s) from this visit.   Latest known visit with results is:   Admission on 01/09/2024, Discharged on 01/11/2024   Component Date Value    Group & Rh 01/09/2024 O POS     Indirect Sean 01/09/2024 NEG     Specimen Outdate 01/09/2024 01/12/2024 23:59     UNIT NUMBER 01/09/2024 [id number]     Product Code 01/09/2024 [id number]     DISPENSE STATUS 01/09/2024 RETURNED     CODING SYSTEM 01/09/2024 [id number]     Unit Blood Type Code 01/09/2024 5100     Unit Blood Type 01/09/2024 O POS     Unit Expiration 01/09/2024 [id number]     CROSSMATCH INTERPRETATION 01/09/2024 Compatible     UNIT NUMBER 01/09/2024 [id number]     Product Code 01/09/2024 [id number]     DISPENSE STATUS 01/09/2024 RETURNED     CODING SYSTEM 01/09/2024 [id number]     Unit Blood Type Code 01/09/2024 5100     Unit Blood Type 01/09/2024 O POS     Unit Expiration 01/09/2024 [id number]     CROSSMATCH INTERPRETATION 01/09/2024 Compatible     Final Pathologic Diagnos* 01/09/2024                      Value:Esophageal mass, excision:   Gastrointestinal stromal tumor (GIST), spindle cell type.      Please see attached synoptic report for additional information.    CANCER CASE SUMMARY SYNOPTIC REPORT (GASTROINTESTINAL STROMAL TUMOR, RESECTION)     CLINICAL  Pre resection treatment:  No known pre resection therapy    SPECIMEN  Procedure: Local excision    TUMOR  Tumor focality:  Unifocal   Multiple primary sites:  Not applicable   Tumor site: Esophagus  Histologic type:  Gastrointestinal stromal tumor, spindle cell type   Tumor size:  6.5 cm (gross measurement)   Mitotic rate:  1 mitoses per 5 mm2  Histologic grade:  Low-grade   Necrosis: Not identified  Treatment effect:  No known pre-surgical therapy   Risk assessment:  Low     MARGINS  Margin status:  Tumor present at inked margin  Margin involved by GIST:   Unoriented specimen    REGIONAL LYMPH NODES  Not applicable (no regional lymph nodes submitted or found)    DISTANT METASTASIS  Not applicable    pTNM CLASSIFICATION (AJCC 8th Edition)                            Modified classification:  N/A  pT category:  pT3:  Tumor more than 5 cm but not more than 10 cm  pN category:  Not assigned  pM category: Not applicable    ADDITIONAL FINDINGS  None    SPECIAL STUDIES  Immunohistochemical stains are performed with appropriate controls.  The tumor cells are positive for  and CD34, while negative for desmin, SMA, and S100.  The findings support the above diagnosis of gastrointestinal stromal tumor.    Ample tumor present in all blocks (for subsequent ancillary studies, if needed)      Supplemental Diagnosis 01/09/2024                      Value:[facility] GIST Panel    Result   Provided diagnosis: esophageal gastrointestinal stromal tumor    Interpretation   1) KIT c.1924A>G (p.K642E) (Exon 13)  The KIT gene encodes the tyrosine kinase receptor Kit (also known as c-Kit or ), which is expressed on the cell surface of a variety of cell types, including hematopoietic stem cells and melanocytes. Binding of the Kit ligand stem cell factor  (SCF, or steel factor) leads to Kit dimerization and activation of the PI3K/Akt and Jhonny/MAPK signaling pathways that regulate cellular proliferation and survival [PMID:92727896]. KIT is considered to be a magdalena-oncogene, and activating mutations of  the KIT gene can lead to tumorigenesis [PMID:98591771].    KIT mutations have been reported in 55?65% of gastrointestinal stromal tumor (GIST) samples [COSMIC, [employer]]. Current data suggests that the efficacy of tyrosine kinase inhibitor therapies (i.e. imatinib) in patients with GIST is   specific to  tumors with activ                          ating KIT mutations, particularly in exon 9 and exon 11 [PMID:25545421, PMID:07634840, PMID:12244317 82371580,  PMID:08430263].     The K642E KIT mutation in exon 13, has been evaluated in an in vitro and clinical context and been shown to be associated with sensitivity to imatinib and other tyrosine kinase inhibitors in patients with GIST [PMID:92918357, PMID:58309387,   PMID:61055378]. Therefore, this patient may respond to such therapies.    Of note, the K642E variant has been reported as a germline mutation associated with familial GIST [PMID:62581355, PMID:80785743, PMID:68745018]. However, this assay is not  able to differentiate between somatic and germline alterations.    MSI: Stable (LYRIC)  NO evidence of microsatellite instability was detected suggesting the presence of normal DNA mismatch repair function within the tumor. Current data suggest that advanced stage solid tumors with intact mismatch repair (LYRIC) are less likely to be   responsive  to treatment with immunotherapies                           such as anti-PD-1 therapies [PMID 27235285, PMID 89869510].    Released By   Con[place]ita Hills M.D.      Report attached.    Performing location:  [place] [address]    &quot;Disclaimer:  This case diagnosis was rendered completely by the outside consultation pathologist and the case is electronically signed by an [facility] listed below solely to release the report into the medical record.&quot;      Gross 01/09/2024                      Value:Pathology ID# [id number]   Pathology MRN # [id number]  Hospital/Clinic label MRN# [id number]  CSN:  [id number]    Received in formalin, labeled with the patients name and MRN, designated as , &quot;esophageal mass&quot;, is a 48.0 g, unoriented, tan-pink rubbery mass measuring 6.5 x 4.7 x 3.8 cm.  One side of the mass is remarkable for a 5.5 x 1.5 cm defect with   ragged edges.  The remaining surface is smooth, nodular, with focal areas of adherent hemorrhagic material and cautery artifact. A  definitive resection margin is not identified. Sectioning reveals a tan, fleshy, solid, trabeculated cut surface with focal   areas of scattered petechiae like hemorrhage throughout.  No gross evidence of necrosis.  Grossly the capsule appears to have an average thickness of 0.1 cm.  Representative sections are submitted in cassettes OXT--1-A to MJY--1-D.    Ink Key:  Black- Resection surface  Blue- defect      Ashutosh Ratliff MD, MS  Pathologists' Assistant      Disclaimer 01/09/2024                      Value:Unless the case is a 'gross only' or additional testing only, the final diagnosis for each specimen is based on a microscopic examination of appropriate tissue sections.   (c-KIT) immunohistochemical staining (clone 9.7, DAB detection method) is performed on formalin-fixed (10% neutral buffered formalin), paraffin embedded tissue sections. GIST tumors are considered positive for c-KIT protein expression if any   neoplastic cells demonstrate specific cytoplasmic and/or cell membrane staining. Strong cytoplasmic, membrane, and occasional dot-like perinuclear Golgi staining are reliable indicators of c-KIT expression. Staining of c-KIT in GIST is often   heterogeneous and not all neoplastic cells display positivity. This test was developed and performance characteristics determined by [facility], Section of Anatomic Pathology. It has been cleared by the U.S. Food and Drug Administration.    (c-KIT) immunohistochemical staining (clone 9.7, DAB detection method) is per                          formed on formalin-fixed (10% neutral buffered formalin), paraffin embedded tissue sections. GIST tumors are considered positive for c-KIT protein expression if any   neoplastic cells demonstrate specific cytoplasmic and/or cell membrane staining. Strong cytoplasmic, membrane, and occasional dot-like perinuclear Golgi staining are reliable indicators of c-KIT expression. Staining of c-KIT in  GIST is often   heterogeneous and not all neoplastic cells display positivity. This test was developed and performance characteristics determined by [facility], Section of Anatomic Pathology. It has been cleared by the U.S. Food and Drug Administration.       POCT Glucose 01/10/2024 107      Assessment and Plan:     1. Gastrointestinal stromal tumor (GIST) of esophagus      This 67 y/o gentleman presented with recent diagnosis of GIST in esophagus s/p robotic esophageal mass resection per Dr. Orozco on 1/9/24.   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 However given positive margin post resection, his recurrence risk is higher. We discussed need for reimaging to determine residual disease burden. We discussed benefit of adjuvant imatinib in KIT mutated GIST. Patient prefers surveillance over adjvuant imatinib if CT is negative for residual disease.    CT CAP ordered  Follow up virtually after CT CAP.     HTN, HLD  BP elevated today.  He will continue current antihypertensive medication regimen.  Continue statin.    Discussed with Dr. Nancy Che MD PGY-6  [facility]     Route Chart for Scheduling    Med Onc Chart Routing      Follow up with physician 2 weeks. virtual   Follow up with SEVERIANO    Infusion scheduling note    Injection scheduling note    Labs    Imaging CT chest abdomen pelvis   within 1-2 week before next virtual appt   Pharmacy appointment    Other referrals

## 2024-03-10 ENCOUNTER — HOSPITAL ENCOUNTER (OUTPATIENT)
Dept: RADIOLOGY | Facility: HOSPITAL | Age: 69
Discharge: HOME | End: 2024-03-10
Attending: INTERNAL MEDICINE
Payer: MEDICARE

## 2024-03-10 DIAGNOSIS — C49.A1 GASTROINTESTINAL STROMAL TUMOR (GIST) OF ESOPHAGUS: ICD-10-CM

## 2024-03-10 LAB
ALLENS TEST: NORMAL
CREAT SERPL-MCNC: 1.2 MG/DL (ref 0.5–1.4)
SAMPLE: NORMAL

## 2024-03-10 PROCEDURE — 82565 ASSAY OF CREATININE: CPT

## 2024-03-10 PROCEDURE — 99900035 HC TECH TIME PER 15 MIN (STAT)

## 2024-03-10 PROCEDURE — 74177 CT ABD & PELVIS W/CONTRAST: CPT | Mod: TC

## 2024-03-10 PROCEDURE — 71260 CT THORAX DX C+: CPT | Mod: 26,,, | Performed by: RADIOLOGY

## 2024-03-10 PROCEDURE — 25500020 PHARM REV CODE 255: Performed by: INTERNAL MEDICINE

## 2024-03-10 PROCEDURE — A9698 NON-RAD CONTRAST MATERIALNOC: HCPCS | Performed by: INTERNAL MEDICINE

## 2024-03-10 PROCEDURE — 74177 CT ABD & PELVIS W/CONTRAST: CPT | Mod: 26,,, | Performed by: RADIOLOGY

## 2024-03-10 RX ADMIN — IOHEXOL 100 ML: 350 INJECTION, SOLUTION INTRAVENOUS at 12:03

## 2024-03-10 RX ADMIN — IOHEXOL 1000 ML: 12 SOLUTION ORAL at 12:03

## 2024-03-18 ENCOUNTER — OFFICE VISIT (OUTPATIENT)
Dept: HEMATOLOGY/ONCOLOGY | Facility: CLINIC | Age: 69
End: 2024-03-18
Payer: MEDICARE

## 2024-03-18 DIAGNOSIS — E78.5 HYPERLIPIDEMIA, UNSPECIFIED HYPERLIPIDEMIA TYPE: ICD-10-CM

## 2024-03-18 DIAGNOSIS — C49.A1 GASTROINTESTINAL STROMAL TUMOR (GIST) OF ESOPHAGUS: Primary | ICD-10-CM

## 2024-03-18 DIAGNOSIS — I10 PRIMARY HYPERTENSION: ICD-10-CM

## 2024-03-18 PROCEDURE — 99214 OFFICE O/P EST MOD 30 MIN: CPT | Mod: 95,,, | Performed by: INTERNAL MEDICINE

## 2024-03-18 NOTE — PROGRESS NOTES
The patient location is: [facility]    Visit type: audiovisual    Each patient to whom he or she provides medical services by telemedicine is:  (1) informed of the relationship between the physician and patient and the respective role of any other health care provider with respect to management of the patient; and (2) notified that he or she may decline to receive medical services by telemedicine and may withdraw from such care at any time.      Oncology Clinic   Established Patient Note    Patient: Clarence Alberto  MRN: [id number]  Date: 3/18/2024    Chief Complaint: GIST    Oncologic History:   This 67 y/o gentleman presented with chronic cough and multiple URIs, which prompted CT chest. Reviewed imaging from 11/2023, noting 5.3 x 4.5 cm mediastinal mass. He underwent EUS per Dr. Turpin in 12/2023, who identified round hypoechoic mass at 29cm extending to 35cm in the middle third of esophagus. He underwent robotic esophageal mass resection per Dr. Orozco on 1/9/24.   Pathology consistent with GIST low grade, mitotic rate 1, negative margins (clarified with Dr. 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 Alberto is a 68 y.o. male who follows up regarding his GIST.  Still recovering well from surgery.  No pain, no change in weight. Eating well.  No other new concerns.     Past Medical History:   Past Medical History:   Diagnosis Date    HTN (hypertension)     Mild intermittent asthma, uncomplicated        Past Surgical HIstory:   Past Surgical History:   Procedure Laterality Date    BRONCHOSCOPY N/A 1/9/2024    Procedure: BRONCHOSCOPY;  Surgeon: Forest Orozco MD;  Location: [facility] OR [address] [place] [address];  Service: Cardiothoracic;  Laterality: N/A;    ENDOSCOPIC ULTRASOUND OF UPPER GASTROINTESTINAL TRACT N/A 12/29/2023    Procedure: ULTRASOUND, UPPER GI TRACT, ENDOSCOPIC;  Surgeon: Pierre Turpin MD;  Location: [facility] ([address] [place] [address]);  Service:  Endoscopy;  Laterality: N/A;  12/27/23: instructions sent via portal- GD    ESOPHAGOGASTRODUODENOSCOPY N/A 12/29/2023    Procedure: EGD (ESOPHAGOGASTRODUODENOSCOPY);  Surgeon: Pierre Turpin MD;  [facility]  Service: Endoscopy;  Laterality: N/A;    ESOPHAGOGASTRODUODENOSCOPY N/A 1/9/2024    Procedure: EGD (ESOPHAGOGASTRODUODENOSCOPY);  Surgeon: Forest Orozco MD;  [facility];  Service: Cardiothoracic;  Laterality: N/A;    INJECTION OF ANESTHETIC AGENT AROUND MULTIPLE INTERCOSTAL NERVES Right 1/9/2024    Procedure: BLOCK, NERVE, INTERCOSTAL, 2 OR MORE;  Surgeon: Forest Orozco MD;  [facility];  Service: Cardiothoracic;  Laterality: Right;    XI ROBOTIC RATS Right 1/9/2024    Procedure: XI ROBOTIC ESOPHAGEAL MASS RESECTION;  Surgeon: Forest Orozco MD;  Location: [facility] OR [address] [place] [address];  Service: Cardiothoracic;  Laterality: Right;       Family History:   Family History   Problem Relation Age of Onset    Lung cancer Maternal Uncle 38       Social History:  reports that he has never smoked. He has never used smokeless tobacco. He reports current alcohol use of about 7.0 standard drinks of alcohol per week. He reports that he does not use drugs.    Medications:  Current Outpatient Medications   Medication Sig Dispense Refill    acetaminophen (TYLENOL) 500 MG tablet Take 2 tablets (1,000 mg total) by mouth every 8 (eight) hours. (Patient not taking: Reported on 2/15/2024)  0    atorvastatin (LIPITOR) 20 MG tablet = 1 tab, Oral, Daily, # 90 tab, 0 Refill(s), Maintenance, Pharmacy: [id number], 183, cm, 09/12/23 12:39:00 CDT, Height/Length Measured, 86.2, kg, 09/12/23 12:47:00 CDT, Weight Dosing      gabapentin (NEURONTIN) 300 MG capsule Take 1 capsule (300 mg total) by mouth every evening. (Patient not taking: Reported on 2/15/2024) 30 capsule 11    hydroCHLOROthiazide (HYDRODIURIL) 12.5 MG Tab Take 12.5 mg by mouth.      hydrocodone-acetaminophen (HYCET)  solution 7.5-325 mg/15mL Take 15 mLs by mouth every 6 (six) hours as needed for Pain. (Patient not taking: Reported on 2/15/2024) 473 mL 0    lisinopriL (PRINIVIL,ZESTRIL) 40 MG tablet Take 40 mg by mouth once daily.      om-3-dha-epa-fish-D3-saw-herbs (PROSTATE OPTIMIZER) 6.6 mg-100 mg- 200 mg-5 mcg Cap Take 1 capsule by mouth Daily.      SYMBICORT 160-4.5 mcg/actuation HFAA Inhale 2 puffs into the lungs 2 (two) times daily.       No current facility-administered medications for this visit.       Review of Systems   Constitutional:  Negative for chills, fatigue and fever.   HENT:  Negative for rhinorrhea and sore throat.    Respiratory:  Negative for cough, shortness of breath and wheezing.    Cardiovascular:  Negative for chest pain, palpitations and leg swelling.   Gastrointestinal:  Negative for diarrhea, nausea and vomiting.   Genitourinary:  Negative for dysuria and flank pain.   Musculoskeletal:  Negative for back pain.   Skin:  Negative for rash and wound.   Neurological:  Negative for weakness and numbness.   Psychiatric/Behavioral:  Negative for agitation, behavioral problems and confusion.          Objective:     There were no vitals filed for this visit.      BMI: There is no height or weight on file to calculate BMI.     Physical Exam  Constitutional:       General: He is not in acute distress.     Appearance: Normal appearance. He is not ill-appearing.   HENT:      Head: Normocephalic and atraumatic.   Eyes:      General: No scleral icterus.     Extraocular Movements: Extraocular movements intact.      Conjunctiva/sclera: Conjunctivae normal.   Pulmonary:      Effort: Pulmonary effort is normal. No respiratory distress.   Neurological:      Mental Status: He is alert and oriented to person, place, and time.   Psychiatric:         Mood and Affect: Mood normal.         Behavior: Behavior normal.         Thought Content: Thought content normal.         Judgment: Judgment normal.       Laboratory Data:  No  visits with results within 1 Week(s) from this visit.   Latest known visit with results is:   Hospital Outpatient Visit on 03/10/2024   Component Date Value    POC Creatinine 03/10/2024 1.2     Sample 03/10/2024 Other     Allens Test 03/10/2024 N/A      Assessment and Plan:     1. Gastrointestinal stromal tumor (GIST) of esophagus    2. Hyperlipidemia, unspecified hyperlipidemia type    3. Primary hypertension        67 y/o gentleman presented with GIST in esophagus s/p robotic esophageal mass resection per Dr. Orozco on 1/9/24.   Intermediate risk given size and mitotic rate (mitotic rate 1, 6.5 cm mass) with negative margins (clarified with pathology and Dr. Orozco).  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   CT CAP post-op showed VALD.  Tiny pulmonary nodules to be monitored on repeat CT in 12 months.  Incidental possible prostate dome lesion discussed.  He is up to date on PSA but I advised he have a recheck within a month or two and discuss role of MRI to better evaluate.  We previously discussed benefit of adjuvant imatinib in KIT mutated GIST. Patient prefers surveillance over adjvuant imatinib.  Follow up 12 months virtually after CT CAP.     HTN, HLD  He will continue current antihypertensive medication regimen.  Continue statin.    RTC in 1 year with CT.    Neel Cruz MD  Hematology/Oncology  [facility]      Route Chart for Scheduling    Med Onc Chart Routing      Follow up with physician 1 year. virtual   Follow up with SEVERIANO    Infusion scheduling note    Injection scheduling note    Labs CBC and CMP   Scheduling:  Preferred lab:  Lab interval:     Imaging CT chest abdomen pelvis      Pharmacy appointment    Other referrals

## 2025-02-07 DIAGNOSIS — C49.A1 GASTROINTESTINAL STROMAL TUMOR (GIST) OF ESOPHAGUS: Primary | ICD-10-CM

## 2025-04-04 ENCOUNTER — HOSPITAL ENCOUNTER (OUTPATIENT)
Dept: RADIOLOGY | Facility: HOSPITAL | Age: 70
Discharge: HOME | End: 2025-04-04
Attending: INTERNAL MEDICINE
Payer: MEDICARE

## 2025-04-04 DIAGNOSIS — C49.A1 GASTROINTESTINAL STROMAL TUMOR (GIST) OF ESOPHAGUS: ICD-10-CM

## 2025-04-04 PROCEDURE — 74177 CT ABD & PELVIS W/CONTRAST: CPT | Mod: 26,,, | Performed by: INTERNAL MEDICINE

## 2025-04-04 PROCEDURE — 71260 CT THORAX DX C+: CPT | Mod: 26,,, | Performed by: INTERNAL MEDICINE

## 2025-04-04 PROCEDURE — 25500020 PHARM REV CODE 255: Performed by: INTERNAL MEDICINE

## 2025-04-04 PROCEDURE — A9698 NON-RAD CONTRAST MATERIALNOC: HCPCS | Performed by: INTERNAL MEDICINE

## 2025-04-04 PROCEDURE — 74177 CT ABD & PELVIS W/CONTRAST: CPT | Mod: TC

## 2025-04-04 RX ADMIN — BARIUM SULFATE 450 ML: 20 SUSPENSION ORAL at 03:04

## 2025-04-04 RX ADMIN — IOHEXOL 100 ML: 350 INJECTION, SOLUTION INTRAVENOUS at 03:04

## 2025-04-07 ENCOUNTER — OFFICE VISIT (OUTPATIENT)
Dept: HEMATOLOGY/ONCOLOGY | Facility: CLINIC | Age: 70
End: 2025-04-07
Payer: MEDICARE

## 2025-04-07 DIAGNOSIS — C49.A1 GASTROINTESTINAL STROMAL TUMOR (GIST) OF ESOPHAGUS: Primary | ICD-10-CM

## 2025-04-07 DIAGNOSIS — I10 PRIMARY HYPERTENSION: ICD-10-CM

## 2025-04-07 DIAGNOSIS — E78.5 HYPERLIPIDEMIA, UNSPECIFIED HYPERLIPIDEMIA TYPE: ICD-10-CM

## 2025-04-07 NOTE — PROGRESS NOTES
The patient location is: [facility]    Visit type: audiovisual    Each patient to whom he or she provides medical services by telemedicine is:  (1) informed of the relationship between the physician and patient and the respective role of any other health care provider with respect to management of the patient; and (2) notified that he or she may decline to receive medical services by telemedicine and may withdraw from such care at any time.      Oncology Clinic   Established Patient Note    Patient: Clarence Alberto  MRN: [id number]  Date: 4/7/2025    Chief Complaint: GIST    Oncologic History:   This 70 y/o gentleman presented with chronic cough and multiple URIs, which prompted CT chest. Reviewed imaging from 11/2023, noting 5.3 x 4.5 cm mediastinal mass. He underwent EUS per Dr. Turpin in 12/2023, who identified round hypoechoic mass at 29cm extending to 35cm in the middle third of esophagus. He underwent robotic esophageal mass resection per Dr. Orozco on 1/9/24.   Pathology consistent with GIST low grade, mitotic rate 1, negative margins (clarified with Dr. 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 Alberto is a 69 y.o. male who follows up regarding his GIST.  He feels very well.  No issues with eating or pain.  No new concerns.      Past Medical History:   Past Medical History:   Diagnosis Date    HTN (hypertension)     Mild intermittent asthma, uncomplicated        Past Surgical HIstory:   Past Surgical History:   Procedure Laterality Date    BRONCHOSCOPY N/A 1/9/2024    Procedure: BRONCHOSCOPY;  Surgeon: Forest Orozco MD;  Location: [facility] OR [address] [place] [address];  Service: Cardiothoracic;  Laterality: N/A;    ENDOSCOPIC ULTRASOUND OF UPPER GASTROINTESTINAL TRACT N/A 12/29/2023    Procedure: ULTRASOUND, UPPER GI TRACT, ENDOSCOPIC;  Surgeon: Pierre Turpin MD;  Location: [facility] ([address] [place] [address]);  Service: Endoscopy;  Laterality: N/A;   12/27/23: instructions sent via portal- GD    ESOPHAGOGASTRODUODENOSCOPY N/A 12/29/2023    Procedure: EGD (ESOPHAGOGASTRODUODENOSCOPY);  Surgeon: Pierre Turpin MD;  Location: [facility] (2ND FLR);  Service: Endoscopy;  Laterality: N/A;    ESOPHAGOGASTRODUODENOSCOPY N/A 1/9/2024    Procedure: EGD (ESOPHAGOGASTRODUODENOSCOPY);  Surgeon: Forest Orozco MD;  [facility];  Service: Cardiothoracic;  Laterality: N/A;    INJECTION OF ANESTHETIC AGENT AROUND MULTIPLE INTERCOSTAL NERVES Right 1/9/2024    Procedure: BLOCK, NERVE, INTERCOSTAL, 2 OR MORE;  Surgeon: Forest Orozco MD;  [facility];  Service: Cardiothoracic;  Laterality: Right;    XI ROBOTIC RATS Right 1/9/2024    Procedure: XI ROBOTIC ESOPHAGEAL MASS RESECTION;  Surgeon: Forest Orozco MD;  [facility];  Service: Cardiothoracic;  Laterality: Right;       Family History:   Family History   Problem Relation Name Age of Onset    Lung cancer Maternal Uncle  38       Social History:  reports that he has never smoked. He has never used smokeless tobacco. He reports current alcohol use of about 7.0 standard drinks of alcohol per week. He reports that he does not use drugs.    Medications:  Current Outpatient Medications   Medication Sig Dispense Refill    hydroCHLOROthiazide (HYDRODIURIL) 12.5 MG Tab Take 12.5 mg by mouth.      lisinopriL (PRINIVIL,ZESTRIL) 40 MG tablet Take 40 mg by mouth once daily.      om-3-dha-epa-fish-D3-saw-herbs (PROSTATE OPTIMIZER) 6.6 mg-100 mg- 200 mg-5 mcg Cap Take 1 capsule by mouth Daily.      SYMBICORT 160-4.5 mcg/actuation HFAA Inhale 2 puffs into the lungs 2 (two) times daily.      acetaminophen (TYLENOL) 500 MG tablet Take 2 tablets (1,000 mg total) by mouth every 8 (eight) hours. (Patient not taking: Reported on 2/15/2024)  0    atorvastatin (LIPITOR) 20 MG tablet = 1 tab, Oral, Daily, # 90 tab, 0 Refill(s), Maintenance, Pharmacy: [facility] STORE [id number], 183, cm, 09/12/23 12:39:00 CDT,  Height/Length Measured, 86.2, kg, 09/12/23 12:47:00 CDT, Weight Dosing      gabapentin (NEURONTIN) 300 MG capsule Take 1 capsule (300 mg total) by mouth every evening. (Patient not taking: Reported on 2/15/2024) 30 capsule 11    hydrocodone-acetaminophen (HYCET) solution 7.5-325 mg/15mL Take 15 mLs by mouth every 6 (six) hours as needed for Pain. (Patient not taking: Reported on 2/15/2024) 473 mL 0     No current facility-administered medications for this visit.       Review of Systems   Constitutional:  Negative for chills, fatigue and fever.   HENT:  Negative for rhinorrhea and sore throat.    Respiratory:  Negative for cough, shortness of breath and wheezing.    Cardiovascular:  Negative for chest pain, palpitations and leg swelling.   Gastrointestinal:  Negative for diarrhea, nausea and vomiting.   Genitourinary:  Negative for dysuria and flank pain.   Musculoskeletal:  Negative for back pain.   Skin:  Negative for rash and wound.   Neurological:  Negative for weakness and numbness.   Psychiatric/Behavioral:  Negative for agitation, behavioral problems and confusion.          Objective:     There were no vitals filed for this visit.      BMI: There is no height or weight on file to calculate BMI.     Physical Exam  Constitutional:       General: He is not in acute distress.     Appearance: Normal appearance. He is not ill-appearing.   HENT:      Head: Normocephalic and atraumatic.   Eyes:      General: No scleral icterus.     Extraocular Movements: Extraocular movements intact.      Conjunctiva/sclera: Conjunctivae normal.   Pulmonary:      Effort: Pulmonary effort is normal. No respiratory distress.   Neurological:      Mental Status: He is alert and oriented to person, place, and time.   Psychiatric:         Mood and Affect: Mood normal.         Behavior: Behavior normal.         Thought Content: Thought content normal.         Judgment: Judgment normal.       Laboratory Data:  Lab Visit on 04/04/2025    Component Date Value    WBC 04/04/2025 11.41     RBC 04/04/2025 4.53 (L)     HGB 04/04/2025 14.5     HCT 04/04/2025 43.2     MCV 04/04/2025 95     MCH 04/04/2025 32.0 (H)     MCHC 04/04/2025 33.6     RDW 04/04/2025 12.0     Platelet Count 04/04/2025 221     MPV 04/04/2025 9.2     Neut # 04/04/2025 7.78 (H)     Imm Grans # 04/04/2025 0.05 (H)     Sodium 04/04/2025 136     Potassium 04/04/2025 4.2     Chloride 04/04/2025 103     CO2 04/04/2025 24     Glucose 04/04/2025 84     BUN 04/04/2025 26 (H)     Creatinine 04/04/2025 1.2     Calcium 04/04/2025 9.7     Protein Total 04/04/2025 7.7     Albumin 04/04/2025 4.0     Bilirubin Total 04/04/2025 0.3     ALP 04/04/2025 73     AST 04/04/2025 26     ALT 04/04/2025 21     Anion Gap 04/04/2025 9     eGFR 04/04/2025 >60      Assessment and Plan:     1. Gastrointestinal stromal tumor (GIST) of esophagus    2. Hyperlipidemia, unspecified hyperlipidemia type    3. Primary hypertension          70 y/o gentleman presented with GIST in esophagus s/p robotic esophageal mass resection per Dr. Orozco on 1/9/24.   Intermediate risk given size and mitotic rate (mitotic rate 1, 6.5 cm mass) with negative margins (clarified with pathology and Dr. Orozco).  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   CT CAP post-op showed VLAD.  Tiny pulmonary nodules to be monitored on repeat CT in 12 months.  We previously discussed benefit of adjuvant imatinib in KIT mutated GIST. Patient prefers surveillance over adjuvant imatinib.  Incidental possible prostate dome lesion discussed from prior imaging.  He monitors his PSA closely with his PCP.  Surveillance CT CAP 4/4/25 shows VLAD.  Repeat in one year.  Follow up 12 months virtually after CT CAP.     HTN, HLD  He will continue current antihypertensive medication regimen.  Continue statin.    RTC in 1 year with  CT.    Neel Cruz MD  Hematology/Oncology  [facility]      Route Chart for Scheduling  Med Onc Route Chart for Scheduling

## (undated) DEVICE — DRAPE ARM DAVINCI XI

## (undated) DEVICE — SOL WATER STRL IRR 1000ML

## (undated) DEVICE — DRAPE SCOPE PILLOW WARMER

## (undated) DEVICE — NDL SPINAL 18GX3.5 SPINOCAN

## (undated) DEVICE — DRESSING TRANS 4X4 TEGADERM

## (undated) DEVICE — GAUZE SPONGE 4X4 12PLY

## (undated) DEVICE — ELECTRODE REM PLYHSV RETURN 9

## (undated) DEVICE — OBTURATOR BLADELESS 8MM XI CLR

## (undated) DEVICE — CLOSURE SKIN STERI STRIP 1/2X4

## (undated) DEVICE — SUT 0 VICRYL / CT-1

## (undated) DEVICE — SUT MCRYL PLUS 4-0 PS2 27IN

## (undated) DEVICE — DRESSING TRANS 2X2 TEGADERM

## (undated) DEVICE — LOOP VESSEL BLUE MAXI

## (undated) DEVICE — SET TRI-LUMEN FILTERED TUBE

## (undated) DEVICE — DRESSING SURGICAL 1X3

## (undated) DEVICE — PORT AIRSEAL 12/120MM LPI

## (undated) DEVICE — DRAPE ABDOMINAL TIBURON 14X11

## (undated) DEVICE — GLOVE BIOGEL ORTHOPEDIC 7.5

## (undated) DEVICE — CANNULA REDUCER 12-8MM

## (undated) DEVICE — KIT ANTIFOG W/SPONG & FLUID

## (undated) DEVICE — TAPE SILK 3IN

## (undated) DEVICE — DRAIN CHEST DRY SUCTION

## (undated) DEVICE — COVER LIGHT HANDLE

## (undated) DEVICE — SUT MONOCRYL UD 4-0 27 PS-1

## (undated) DEVICE — CONTAINER SPECIMEN STRL 4OZ

## (undated) DEVICE — GOWN SMART IMP BREATHABLE XXLG

## (undated) DEVICE — SYR SLIP TIP 20CC

## (undated) DEVICE — DRESSING TELFA N ADH 3X8

## (undated) DEVICE — ADHESIVE MASTISOL VIAL 48/BX

## (undated) DEVICE — SYR ONLY LUER LOCK 20CC

## (undated) DEVICE — SUT SILK 0 STRANDS 30IN BLK

## (undated) DEVICE — TUBING SUC UNIV W/CONN 12FT

## (undated) DEVICE — SUT 0 30IN ETHIBOND EXCEL G

## (undated) DEVICE — SUT VICRYL PLUS 2-0 CT1 18

## (undated) DEVICE — HEMOSTAT SURGICEL NUKNIT 3X4IN

## (undated) DEVICE — GAUZE DRAIN N WVN 6PLY 4X4IN

## (undated) DEVICE — TRAY MINOR GEN SURG OMC

## (undated) DEVICE — SEAL UNIVERSAL 5MM-8MM XI

## (undated) DEVICE — CANNULA SEAL 12MM

## (undated) DEVICE — DRAPE INCISE IOBAN 2 23X17IN

## (undated) DEVICE — DRAPE COLUMN DAVINCI XI

## (undated) DEVICE — ELECTRODE BLADE INSULATED 1 IN

## (undated) DEVICE — SUT 2-0 VICRYL / CT-1